# Patient Record
Sex: FEMALE | Race: WHITE | NOT HISPANIC OR LATINO | Employment: FULL TIME | URBAN - METROPOLITAN AREA
[De-identification: names, ages, dates, MRNs, and addresses within clinical notes are randomized per-mention and may not be internally consistent; named-entity substitution may affect disease eponyms.]

---

## 2017-12-20 ENCOUNTER — OFFICE VISIT (OUTPATIENT)
Dept: OBGYN | Facility: CLINIC | Age: 32
End: 2017-12-20
Payer: COMMERCIAL

## 2017-12-20 VITALS
SYSTOLIC BLOOD PRESSURE: 140 MMHG | HEART RATE: 93 BPM | TEMPERATURE: 98 F | WEIGHT: 173.8 LBS | HEIGHT: 65 IN | BODY MASS INDEX: 28.96 KG/M2 | DIASTOLIC BLOOD PRESSURE: 79 MMHG

## 2017-12-20 DIAGNOSIS — Z01.419 ENCOUNTER FOR GYNECOLOGICAL EXAMINATION WITHOUT ABNORMAL FINDING: Primary | ICD-10-CM

## 2017-12-20 DIAGNOSIS — Z98.84 BARIATRIC SURGERY STATUS: ICD-10-CM

## 2017-12-20 LAB
BASOPHILS # BLD AUTO: 0.1 10E9/L (ref 0–0.2)
BASOPHILS NFR BLD AUTO: 1.5 %
DIFFERENTIAL METHOD BLD: ABNORMAL
EOSINOPHIL # BLD AUTO: 0.2 10E9/L (ref 0–0.7)
EOSINOPHIL NFR BLD AUTO: 4.2 %
ERYTHROCYTE [DISTWIDTH] IN BLOOD BY AUTOMATED COUNT: 13.8 % (ref 10–15)
FERRITIN SERPL-MCNC: 5 NG/ML (ref 12–150)
HCT VFR BLD AUTO: 39.4 % (ref 35–47)
HGB BLD-MCNC: 12.2 G/DL (ref 11.7–15.7)
IRON SERPL-MCNC: 108 UG/DL (ref 35–180)
LYMPHOCYTES # BLD AUTO: 1.6 10E9/L (ref 0.8–5.3)
LYMPHOCYTES NFR BLD AUTO: 30.7 %
MCH RBC QN AUTO: 27.7 PG (ref 26.5–33)
MCHC RBC AUTO-ENTMCNC: 31 G/DL (ref 31.5–36.5)
MCV RBC AUTO: 90 FL (ref 78–100)
MONOCYTES # BLD AUTO: 0.4 10E9/L (ref 0–1.3)
MONOCYTES NFR BLD AUTO: 7.6 %
NEUTROPHILS # BLD AUTO: 3 10E9/L (ref 1.6–8.3)
NEUTROPHILS NFR BLD AUTO: 56 %
PLATELET # BLD AUTO: 293 10E9/L (ref 150–450)
RBC # BLD AUTO: 4.4 10E12/L (ref 3.8–5.2)
VIT B12 SERPL-MCNC: 528 PG/ML (ref 193–986)
WBC # BLD AUTO: 5.3 10E9/L (ref 4–11)

## 2017-12-20 PROCEDURE — G0145 SCR C/V CYTO,THINLAYER,RESCR: HCPCS | Performed by: NURSE PRACTITIONER

## 2017-12-20 PROCEDURE — 82728 ASSAY OF FERRITIN: CPT | Performed by: NURSE PRACTITIONER

## 2017-12-20 PROCEDURE — 82306 VITAMIN D 25 HYDROXY: CPT | Performed by: NURSE PRACTITIONER

## 2017-12-20 PROCEDURE — 82607 VITAMIN B-12: CPT | Performed by: NURSE PRACTITIONER

## 2017-12-20 PROCEDURE — 99395 PREV VISIT EST AGE 18-39: CPT | Performed by: NURSE PRACTITIONER

## 2017-12-20 PROCEDURE — 36415 COLL VENOUS BLD VENIPUNCTURE: CPT | Performed by: NURSE PRACTITIONER

## 2017-12-20 PROCEDURE — 87624 HPV HI-RISK TYP POOLED RSLT: CPT | Performed by: NURSE PRACTITIONER

## 2017-12-20 PROCEDURE — 85025 COMPLETE CBC W/AUTO DIFF WBC: CPT | Performed by: NURSE PRACTITIONER

## 2017-12-20 PROCEDURE — 83540 ASSAY OF IRON: CPT | Performed by: NURSE PRACTITIONER

## 2017-12-20 ASSESSMENT — PAIN SCALES - GENERAL: PAINLEVEL: NO PAIN (0)

## 2017-12-20 NOTE — MR AVS SNAPSHOT
"              After Visit Summary   12/20/2017    Kevon Wiseman    MRN: [id number]           Patient Information     Date Of Birth          1985        Visit Information        Provider Department      12/20/2017 10:30 AM Etta Haynes APRN CNP [facility]        Today's Diagnoses     Encounter for gynecological examination without abnormal finding    -  1    Bariatric surgery status           Follow-ups after your visit        Who to contact     If you have questions or need follow up information about today's clinic visit or your schedule please contact [facility] directly at [phone].  Normal or non-critical lab and imaging results will be communicated to you by AssertIDhart, letter or phone within 4 business days after the clinic has received the results. If you do not hear from us within 7 days, please contact the clinic through AssertIDhart or phone. If you have a critical or abnormal lab result, we will notify you by phone as soon as possible.  Submit refill requests through [employer] or call your pharmacy and they will forward the refill request to us. Please allow 3 business days for your refill to be completed.          Additional Information About Your Visit        MyChart Information     [employer] gives you secure access to your electronic health record. If you see a primary care provider, you can also send messages to your care team and make appointments. If you have questions, please call your primary care clinic.  If you do not have a primary care provider, please call [phone] and they will assist you.        Care EveryWhere ID     This is your Care EveryWhere ID. This could be used by other organizations to access your [place] medical records  [id number]        Your Vitals Were     Pulse Temperature Height Last Period BMI (Body Mass Index)       93 98  F (36.7  C) (Oral) 5' 5\" (1.651 m) 12/10/2017 (Exact Date) 28.92 kg/m2        Blood Pressure from Last 3 " Encounters:   12/20/17 140/79   06/22/16 133/86   06/15/16 102/64    Weight from Last 3 Encounters:   12/20/17 173 lb 12.8 oz (78.8 kg)   06/22/16 164 lb (74.4 kg)   06/15/16 164 lb (74.4 kg)              We Performed the Following     CBC with platelets differential     Ferritin     HPV High Risk Types DNA Cervical     Iron     Pap imaged thin layer screen with HPV - recommended age 30 - 65 years (select HPV order below)     Vitamin B12     Vitamin D Deficiency          Today's Medication Changes          These changes are accurate as of: 12/20/17 11:14 AM.  If you have any questions, ask your nurse or doctor.               Stop taking these medicines if you haven't already. Please contact your care team if you have questions.     hydrOXYzine 25 MG tablet   Commonly known as:  ATARAX   Stopped by:  Etta Haynes APRN CNP           LORazepam 0.5 MG tablet   Commonly known as:  ATIVAN   Stopped by:  Etta Haynes APRN CNP           pantoprazole 40 MG EC tablet   Commonly known as:  PROTONIX   Stopped by:  Etta Haynes APRN CNP           sertraline 100 MG tablet   Commonly known as:  ZOLOFT   Stopped by:  Etta Haynes APRN CNP                    Primary Care Provider Office Phone # Fax #    [facility] [phone]       No address on file        Equal Access to Services     DECLAN STEWART : Hadii allyson coronado hadasho Soomaali, waaxda luqadaha, qaybta kaalmada adeamanda, radha diggs. So [facility] [phone].    ATENCIÓN: Si habla español, tiene a de león disposición servicios gratuitos de asistencia lingüística. Micaela al [phone].    We comply with applicable federal civil rights laws and [place] laws. We do not discriminate on the basis of race, color, national origin, age, disability, sex, sexual orientation, or gender identity.            Thank you!     Thank you for choosing [facility]  for your care. Our goal is always to  provide you with excellent care. Hearing back from our patients is one way we can continue to improve our services. Please take a few minutes to complete the written survey that you may receive in the mail after your visit with us. Thank you!             Your Updated Medication List - Protect others around you: Learn how to safely use, store and throw away your medicines at www.disposemymeds.org.          This list is accurate as of: 12/20/17 11:14 AM.  Always use your most recent med list.                   Brand Name Dispense Instructions for use Diagnosis    IRON-VITAMIN C PO      Take 1 tablet by mouth daily        MULTIPLE VITAMIN PO      Take 2 chew tab by mouth daily        POTASSIUM PO      Take 1 tablet by mouth daily        PRENATAL VITAMIN PO      Take 2 chew tab by mouth daily

## 2017-12-20 NOTE — LETTER
January 6, 2018    Kevon Wiseman  [address] [facility] [address]    Dear Kevon,  We are happy to inform you that your PAP smear result from 12/20/17 is normal.  We are now able to do a follow up test on PAP smears. The DNA test is for HPV (Human Papilloma Virus). Cervical cancer is closely linked with certain types of HPV. Your result showed no evidence of high risk HPV.  Therefore we recommend you return in 3 years for your next pap smear.  You will still need to return to the clinic every year for an annual exam and other preventive tests.  Please contact the clinic at [phone] with any questions.  Sincerely,    SARAY Acharya CNP/rlm

## 2017-12-20 NOTE — NURSING NOTE
"Chief Complaint   Patient presents with     Physical       Initial /79  Pulse 93  Temp 98  F (36.7  C) (Oral)  Ht 5' 5\" (1.651 m)  Wt 173 lb 12.8 oz (78.8 kg)  LMP 12/10/2017 (Exact Date)  BMI 28.92 kg/m2 Estimated body mass index is 28.92 kg/(m^2) as calculated from the following:    Height as of this encounter: 5' 5\" (1.651 m).    Weight as of this encounter: 173 lb 12.8 oz (78.8 kg)..  BP completed using cuff size: sylvia Zhou CMA    "

## 2017-12-21 LAB — DEPRECATED CALCIDIOL+CALCIFEROL SERPL-MC: 25 UG/L (ref 20–75)

## 2017-12-26 LAB
COPATH REPORT: NORMAL
PAP: NORMAL

## 2017-12-28 LAB
FINAL DIAGNOSIS: NORMAL
HPV HR 12 DNA CVX QL NAA+PROBE: NEGATIVE
HPV16 DNA SPEC QL NAA+PROBE: NEGATIVE
HPV18 DNA SPEC QL NAA+PROBE: NEGATIVE
SPECIMEN DESCRIPTION: NORMAL

## 2019-11-03 ENCOUNTER — HEALTH MAINTENANCE LETTER (OUTPATIENT)
Age: 34
End: 2019-11-03

## 2020-11-16 ENCOUNTER — HEALTH MAINTENANCE LETTER (OUTPATIENT)
Age: 35
End: 2020-11-16

## 2021-04-03 ENCOUNTER — HEALTH MAINTENANCE LETTER (OUTPATIENT)
Age: 36
End: 2021-04-03

## 2021-09-18 ENCOUNTER — HEALTH MAINTENANCE LETTER (OUTPATIENT)
Age: 36
End: 2021-09-18

## 2022-01-02 ENCOUNTER — HEALTH MAINTENANCE LETTER (OUTPATIENT)
Age: 37
End: 2022-01-02

## 2022-11-19 ENCOUNTER — HEALTH MAINTENANCE LETTER (OUTPATIENT)
Age: 37
End: 2022-11-19

## 2023-04-09 ENCOUNTER — HEALTH MAINTENANCE LETTER (OUTPATIENT)
Age: 38
End: 2023-04-09

## 2024-06-06 ENCOUNTER — HOSPITAL ENCOUNTER (OUTPATIENT)
Facility: HOSPITAL | Age: 39
Setting detail: OBSERVATION
Discharge: HOME | End: 2024-06-07
Attending: EMERGENCY MEDICINE | Admitting: EMERGENCY MEDICINE
Payer: COMMERCIAL

## 2024-06-06 ENCOUNTER — HOSPITAL ENCOUNTER (INPATIENT)
Facility: HOSPITAL | Age: 39
Setting detail: SURGERY ADMIT
End: 2024-06-06
Attending: SPECIALIST | Admitting: SPECIALIST
Payer: COMMERCIAL

## 2024-06-06 DIAGNOSIS — K81.9 CHOLECYSTITIS: ICD-10-CM

## 2024-06-06 DIAGNOSIS — K80.20 CALCULUS OF GALLBLADDER WITHOUT CHOLECYSTITIS WITHOUT OBSTRUCTION: Primary | ICD-10-CM

## 2024-06-06 LAB
ALBUMIN SERPL BCG-MCNC: 3.7 G/DL (ref 3.5–5.2)
ALP SERPL-CCNC: 61 U/L (ref 40–150)
ALT SERPL W P-5'-P-CCNC: 30 U/L (ref 0–50)
ANION GAP SERPL CALCULATED.3IONS-SCNC: 8 MMOL/L (ref 7–15)
AST SERPL W P-5'-P-CCNC: 44 U/L (ref 0–45)
BILIRUB SERPL-MCNC: 0.7 MG/DL
BUN SERPL-MCNC: 7.8 MG/DL (ref 6–20)
CALCIUM SERPL-MCNC: 8.3 MG/DL (ref 8.6–10)
CHLORIDE SERPL-SCNC: 105 MMOL/L (ref 98–107)
CREAT SERPL-MCNC: 0.89 MG/DL (ref 0.51–0.95)
DEPRECATED HCO3 PLAS-SCNC: 24 MMOL/L (ref 22–29)
EGFRCR SERPLBLD CKD-EPI 2021: 85 ML/MIN/1.73M2
ERYTHROCYTE [DISTWIDTH] IN BLOOD BY AUTOMATED COUNT: 16.5 % (ref 10–15)
GLUCOSE SERPL-MCNC: 102 MG/DL (ref 70–99)
HCT VFR BLD AUTO: 31.3 % (ref 35–47)
HGB BLD-MCNC: 9.4 G/DL (ref 11.7–15.7)
IRON BINDING CAPACITY (ROCHE): 322 UG/DL (ref 240–430)
IRON SATN MFR SERPL: 17 % (ref 15–46)
IRON SERPL-MCNC: 55 UG/DL (ref 37–145)
MCH RBC QN AUTO: 24.9 PG (ref 26.5–33)
MCHC RBC AUTO-ENTMCNC: 30 G/DL (ref 31.5–36.5)
MCV RBC AUTO: 83 FL (ref 78–100)
PLATELET # BLD AUTO: 186 10E3/UL (ref 150–450)
POTASSIUM SERPL-SCNC: 3.5 MMOL/L (ref 3.4–5.3)
PROT SERPL-MCNC: 6.4 G/DL (ref 6.4–8.3)
RBC # BLD AUTO: 3.77 10E6/UL (ref 3.8–5.2)
SODIUM SERPL-SCNC: 137 MMOL/L (ref 135–145)
WBC # BLD AUTO: 2.8 10E3/UL (ref 4–11)

## 2024-06-06 PROCEDURE — G0378 HOSPITAL OBSERVATION PER HR: HCPCS

## 2024-06-06 PROCEDURE — 83550 IRON BINDING TEST: CPT

## 2024-06-06 PROCEDURE — 36415 COLL VENOUS BLD VENIPUNCTURE: CPT

## 2024-06-06 PROCEDURE — 96376 TX/PRO/DX INJ SAME DRUG ADON: CPT

## 2024-06-06 PROCEDURE — 250N000011 HC RX IP 250 OP 636: Mod: JZ

## 2024-06-06 PROCEDURE — 99222 1ST HOSP IP/OBS MODERATE 55: CPT

## 2024-06-06 PROCEDURE — 96375 TX/PRO/DX INJ NEW DRUG ADDON: CPT

## 2024-06-06 PROCEDURE — 99207 PR APP CREDIT; MD BILLING SHARED VISIT: CPT | Performed by: INTERNAL MEDICINE

## 2024-06-06 PROCEDURE — 96374 THER/PROPH/DIAG INJ IV PUSH: CPT

## 2024-06-06 PROCEDURE — 83540 ASSAY OF IRON: CPT

## 2024-06-06 PROCEDURE — 85027 COMPLETE CBC AUTOMATED: CPT

## 2024-06-06 PROCEDURE — 250N000013 HC RX MED GY IP 250 OP 250 PS 637

## 2024-06-06 PROCEDURE — 80053 COMPREHEN METABOLIC PANEL: CPT

## 2024-06-06 PROCEDURE — 99285 EMERGENCY DEPT VISIT HI MDM: CPT | Mod: 25

## 2024-06-06 RX ORDER — ONDANSETRON 4 MG/1
4 TABLET, ORALLY DISINTEGRATING ORAL EVERY 6 HOURS PRN
Status: DISCONTINUED | OUTPATIENT
Start: 2024-06-06 | End: 2024-06-07 | Stop reason: HOSPADM

## 2024-06-06 RX ORDER — OXYCODONE HYDROCHLORIDE 5 MG/1
5 TABLET ORAL EVERY 4 HOURS PRN
Status: DISCONTINUED | OUTPATIENT
Start: 2024-06-06 | End: 2024-06-07 | Stop reason: HOSPADM

## 2024-06-06 RX ORDER — POLYETHYLENE GLYCOL 3350 17 G/17G
17 POWDER, FOR SOLUTION ORAL 2 TIMES DAILY PRN
Status: DISCONTINUED | OUTPATIENT
Start: 2024-06-06 | End: 2024-06-07 | Stop reason: HOSPADM

## 2024-06-06 RX ORDER — PROCHLORPERAZINE MALEATE 10 MG
10 TABLET ORAL EVERY 6 HOURS PRN
Status: DISCONTINUED | OUTPATIENT
Start: 2024-06-06 | End: 2024-06-07 | Stop reason: HOSPADM

## 2024-06-06 RX ORDER — NALOXONE HYDROCHLORIDE 0.4 MG/ML
0.2 INJECTION, SOLUTION INTRAMUSCULAR; INTRAVENOUS; SUBCUTANEOUS
Status: DISCONTINUED | OUTPATIENT
Start: 2024-06-06 | End: 2024-06-07 | Stop reason: HOSPADM

## 2024-06-06 RX ORDER — ACETAMINOPHEN 325 MG/1
975 TABLET ORAL ONCE
Status: CANCELLED | OUTPATIENT
Start: 2024-06-06 | End: 2024-06-06

## 2024-06-06 RX ORDER — HYDROMORPHONE HCL IN WATER/PF 6 MG/30 ML
0.5 PATIENT CONTROLLED ANALGESIA SYRINGE INTRAVENOUS ONCE
Status: COMPLETED | OUTPATIENT
Start: 2024-06-06 | End: 2024-06-06

## 2024-06-06 RX ORDER — PROCHLORPERAZINE 25 MG
25 SUPPOSITORY, RECTAL RECTAL EVERY 12 HOURS PRN
Status: DISCONTINUED | OUTPATIENT
Start: 2024-06-06 | End: 2024-06-07 | Stop reason: HOSPADM

## 2024-06-06 RX ORDER — NALOXONE HYDROCHLORIDE 0.4 MG/ML
0.4 INJECTION, SOLUTION INTRAMUSCULAR; INTRAVENOUS; SUBCUTANEOUS
Status: DISCONTINUED | OUTPATIENT
Start: 2024-06-06 | End: 2024-06-07 | Stop reason: HOSPADM

## 2024-06-06 RX ORDER — ACETAMINOPHEN 325 MG/1
650 TABLET ORAL EVERY 4 HOURS PRN
Status: DISCONTINUED | OUTPATIENT
Start: 2024-06-06 | End: 2024-06-07 | Stop reason: HOSPADM

## 2024-06-06 RX ORDER — PIPERACILLIN SODIUM, TAZOBACTAM SODIUM 3; .375 G/15ML; G/15ML
3.38 INJECTION, POWDER, LYOPHILIZED, FOR SOLUTION INTRAVENOUS ONCE
Status: COMPLETED | OUTPATIENT
Start: 2024-06-06 | End: 2024-06-06

## 2024-06-06 RX ORDER — GABAPENTIN 100 MG/1
100 CAPSULE ORAL 3 TIMES DAILY PRN
COMMUNITY

## 2024-06-06 RX ORDER — HYDROMORPHONE HCL IN WATER/PF 6 MG/30 ML
0.2 PATIENT CONTROLLED ANALGESIA SYRINGE INTRAVENOUS
Status: DISCONTINUED | OUTPATIENT
Start: 2024-06-06 | End: 2024-06-07 | Stop reason: HOSPADM

## 2024-06-06 RX ORDER — ACETAMINOPHEN 650 MG/1
650 SUPPOSITORY RECTAL EVERY 4 HOURS PRN
Status: DISCONTINUED | OUTPATIENT
Start: 2024-06-06 | End: 2024-06-07 | Stop reason: HOSPADM

## 2024-06-06 RX ORDER — HYDROMORPHONE HCL IN WATER/PF 6 MG/30 ML
0.4 PATIENT CONTROLLED ANALGESIA SYRINGE INTRAVENOUS
Status: DISCONTINUED | OUTPATIENT
Start: 2024-06-06 | End: 2024-06-07 | Stop reason: HOSPADM

## 2024-06-06 RX ORDER — GABAPENTIN 100 MG/1
100 CAPSULE ORAL 3 TIMES DAILY PRN
Status: DISCONTINUED | OUTPATIENT
Start: 2024-06-06 | End: 2024-06-07 | Stop reason: HOSPADM

## 2024-06-06 RX ORDER — PIPERACILLIN SODIUM, TAZOBACTAM SODIUM 3; .375 G/15ML; G/15ML
3.38 INJECTION, POWDER, LYOPHILIZED, FOR SOLUTION INTRAVENOUS EVERY 8 HOURS
Status: DISCONTINUED | OUTPATIENT
Start: 2024-06-06 | End: 2024-06-07 | Stop reason: HOSPADM

## 2024-06-06 RX ORDER — AMOXICILLIN 250 MG
1 CAPSULE ORAL 2 TIMES DAILY PRN
Status: DISCONTINUED | OUTPATIENT
Start: 2024-06-06 | End: 2024-06-07 | Stop reason: HOSPADM

## 2024-06-06 RX ORDER — HYDROXYZINE HYDROCHLORIDE 10 MG/1
10 TABLET, FILM COATED ORAL 3 TIMES DAILY PRN
COMMUNITY

## 2024-06-06 RX ORDER — AMOXICILLIN 250 MG
2 CAPSULE ORAL 2 TIMES DAILY PRN
Status: DISCONTINUED | OUTPATIENT
Start: 2024-06-06 | End: 2024-06-07 | Stop reason: HOSPADM

## 2024-06-06 RX ORDER — ONDANSETRON 2 MG/ML
4 INJECTION INTRAMUSCULAR; INTRAVENOUS EVERY 6 HOURS PRN
Status: DISCONTINUED | OUTPATIENT
Start: 2024-06-06 | End: 2024-06-07 | Stop reason: HOSPADM

## 2024-06-06 RX ADMIN — PIPERACILLIN AND TAZOBACTAM 3.38 G: 3; .375 INJECTION, POWDER, FOR SOLUTION INTRAVENOUS at 23:33

## 2024-06-06 RX ADMIN — HYDROMORPHONE HYDROCHLORIDE 0.2 MG: 0.2 INJECTION, SOLUTION INTRAMUSCULAR; INTRAVENOUS; SUBCUTANEOUS at 20:53

## 2024-06-06 RX ADMIN — ACETAMINOPHEN 650 MG: 325 TABLET ORAL at 20:57

## 2024-06-06 RX ADMIN — OXYCODONE HYDROCHLORIDE 5 MG: 5 TABLET ORAL at 23:33

## 2024-06-06 RX ADMIN — HYDROMORPHONE HYDROCHLORIDE 0.5 MG: 0.2 INJECTION, SOLUTION INTRAMUSCULAR; INTRAVENOUS; SUBCUTANEOUS at 17:04

## 2024-06-06 RX ADMIN — PIPERACILLIN AND TAZOBACTAM 3.38 G: 3; .375 INJECTION, POWDER, FOR SOLUTION INTRAVENOUS at 17:10

## 2024-06-06 ASSESSMENT — ACTIVITIES OF DAILY LIVING (ADL)
ADLS_ACUITY_SCORE: 35
DEPENDENT_IADLS:: INDEPENDENT
ADLS_ACUITY_SCORE: 35

## 2024-06-06 ASSESSMENT — COLUMBIA-SUICIDE SEVERITY RATING SCALE - C-SSRS
6. HAVE YOU EVER DONE ANYTHING, STARTED TO DO ANYTHING, OR PREPARED TO DO ANYTHING TO END YOUR LIFE?: NO
1. IN THE PAST MONTH, HAVE YOU WISHED YOU WERE DEAD OR WISHED YOU COULD GO TO SLEEP AND NOT WAKE UP?: NO
2. HAVE YOU ACTUALLY HAD ANY THOUGHTS OF KILLING YOURSELF IN THE PAST MONTH?: NO

## 2024-06-06 NOTE — CONSULTS
"Care Management Initial Consult    General Information  Assessment completed with: Kevon Elizondo  Type of CM/SW Visit: Initial Assessment    Primary Care Provider verified and updated as needed: Yes   Readmission within the last 30 days: no previous admission in last 30 days      Reason for Consult: discharge planning  Advance Care Planning: Advance Care Planning Reviewed: no concerns identified          Communication Assessment  Patient's communication style: spoken language (English or Bilingual)             Cognitive  Cognitive/Neuro/Behavioral: WDL                      Living Environment:   People in home: other (see comments), child(luciana), dependent (\"I have been living with my partner and kids. After surgery I will stay with my parents in their house in [place]\".)     Current living Arrangements: house      Able to return to prior arrangements: yes       Family/Social Support:  Care provided by: self  Provides care for: no one  Marital Status:  (\"getting a divorce\")  Parent(s)          Description of Support System: Supportive, Involved    Support Assessment: Adequate social supports, Adequate family and caregiver support, Patient communicates needs well met    Current Resources:   Patient receiving home care services: No     Community Resources: None  Equipment currently used at home: none  Supplies currently used at home: None    Employment/Financial:  Employment Status: employed full-time (\"but my last day is 6/7/24 and then I am done for the Summer except some time that I work PT in July\")     Employment/ Comments: \"no  history\"  Financial Concerns:     Referral to Financial Worker: No       Does the patient's insurance plan have a 3 day qualifying hospital stay waiver?  No    Functional Status:  Prior to admission patient needed assistance:   Dependent ADLs:: Independent, Ambulation-no assistive device  Dependent IADLs:: Independent  Assesssment of Functional Status: At functional " "baseline    Mental Health Status:  Mental Health Status: Past Concern  Mental Health Management: Medication, Psychiatrist    Chemical Dependency Status:  Chemical Dependency Status: Past Concern             Values/Beliefs:  Spiritual, Cultural Beliefs, Gnosticist Practices, Values that affect care:                 Additional Information:  Kevon lives in a house with family, but post surgery she plans to stay in her parent's house in [place]. \"They have a 1 level house.\"    She is independent with ADLs and IADLs. She drives and works full time.    Likely home with no new needs.     Plans to drive herself home if able or can get a ride if needed. Her car is in the parking lot.    CM to follow for medical progression of care, discharge recommendations, and final discharge plan.    Rosenda Gilliam RN    "

## 2024-06-06 NOTE — ED PROVIDER NOTES
"Emergency Department Midlevel Supervisory Note     I had a face to face encounter with this patient seen by the Advanced Practice Provider (OTTONIEL). I personally made/approved the management plan and take responsibility for the patient management. I personally saw patient and performed a substantive portion of the visit including all aspects of the medical decision making.     ED Course:  4:43 PM Gini High PA-C staffed patient with me. I agree with their assessment and plan of management, and I will see the patient.  5:01 PM I met with the patient to introduce myself, gather additional history, perform my initial exam, and discuss the plan.     Brief HPI:     Kevon Wiseman is a 38 year old female who presents for evaluation of abdominal pain.     Yesterday patient noticed full body aches.  This morning patient woke up patient noted right upper quadrant abdominal pain.  No other symptoms.  No fevers or chills.  No nausea or vomiting.  No diarrhea.     Patient went to urgent care and was diagnosed to cholecystitis.  The urgent care physician spoke with Dr. Reina from general surgery and plans to do cholecystectomy tomorrow.  Patient received a dose of Toradol in urgent care.  No antibiotics were given.    I, Becca Brasher, am serving as a scribe to document services personally performed by HERON MCRAE MD, based on my observations and the provider's statements to me.   I, HERON MCRAE MD attest that Becca Brasher was acting in a scribe capacity, has observed my performance of the services and has documented them in accordance with my direction.    Brief Physical Exam: /76 (BP Location: Left arm)   Pulse 78   Temp 98.9  F (37.2  C) (Oral)   Resp 17   Ht 1.626 m (5' 4\")   Wt 79.4 kg (175 lb)   SpO2 99%   BMI 30.04 kg/m    Constitutional:  Alert, in no acute distress  EYES: Conjunctivae clear  HENT:  Atraumatic  Respiratory:  Respirations even, unlabored, in no acute respiratory " distress  Cardiovascular:  Regular rate and rhythm, good peripheral perfusion  GI: Soft, non-distended, non-tender  Musculoskeletal:  Moves all 4 extremities equally, grossly symmetrical strength  Integument: Warm & dry. No appreciable rash, erythema.  Neurologic:  Alert & oriented, speech clear and fluent, no focal deficits noted  Psych: Normal mood and affect       MDM:  Again, patient is a 38-year-old female who presents with abdominal pain.  Patient is afebrile with no tachycardia or hypoxia.  Patient seen previously today at urgent care where CT imaging reported cholelithiasis, ultrasound imaging reported gallstones within contracted gallbladder with a positive sonographic Milligan sign.  Per report patient case discussed with general surgery and at this time recommendations are for admission, antibiotics and surgical consultation.  We will place orders for antibiotics and pain management and likely consult with general surgery just to let them know that patient is now here in the department.  Patient will be admitted.       1. Cholecystitis        Consults:  Patient case discussed with general surgery    Labs and Imaging:       I independently interpreted outside labs, patient had no signs of acute kidney injury with a creatinine of 0.89, and no signs of anemia with a hemoglobin of 10.8.    I have reviewed the relevant laboratory studies above.      I independently interpreted the following imaging study(s):       EKG: I reviewed and independently interpreted the patient's EKG, with comments made as listed below. Please see scanned EKG for full report.       Procedures:  I was present for the key portions of procedures documented in OTTONIEL/midlevel note, see midlevel note for further details.    YEHUDA CONTE MD  [facility] EMERGENCY DEPARTMENT  [address] [place] [id number]  [phone]       Yehuda Conte MD  06/06/24 2010

## 2024-06-06 NOTE — ED NOTES
Expected Patient Referral to ED  3:49 PM    Referring Clinic/Provider:  [facility]    Reason for referral/Clinical facts:  Early cholecystitis, gallstones and bumped LFTs but no pericholecystic fluid, urgency room spoke with Dr. Reina of surgery and plans to surgerize tomorrow.     Recommendations provided:  Send to ED for further evaluation    Caller was informed that this institution does possess the capabilities and/or resources to provide for patient and should be transferred to our facility.    Discussed that if direct admit is sought and any hurdles are encountered, this ED would be happy to see the patient and evaluate.    Informed caller that recommendations provided are recommendations based only on the facts provided and that they responsible to accept or reject the advice, or to seek a formal in person consultation as needed and that this ED will see/treat patient should they arrive.      Mo Medel MD  [facility] EMERGENCY DEPARTMENT  [address] [place] [id number]  [phone]       Mo Medel MD  06/06/24 6241

## 2024-06-06 NOTE — H&P
[facility]    History and Physical - Hospitalist Service       Date of Admission:  6/6/2024    Assessment & Plan    Kevon Wiseman is a 38 year old female with PMHx of gastric bypass, LIZABETH, BRCA1 mutation who was admitted on 6/6/2024 for acute cholecysitis. She presented to the UC for abdominal pain and myalgias. US concerning for acute cholecystitis; gen surg consulted     RUQ Pain, Concern for Acute Cholecystitis   -- CT AP: cholelithiasis   -- US: Gallstones within a contracted gallbladder with positive sonographic Milligan's sign. Hepatic steatosis   -- ALT/AST minimally elevated in UC, normal here; trend CMP   -- Started on Zosyn in the ED; continue for now   -- Surgery consult placed   -- NPO MN   -- Pain control with Tylenol, PRN oxycodone/Diluadid   -- Antiemetics PRN     Anemia   Leukopenia   ? Unclear etiology, suspect secondary to infection as above   -- Hgb 9.4 (down from 10.8 in the UC)  -- WBC 2.8 (down from 3.5 in the UC)  -- No s/s of bleeding   -- Patient does have a history of BRCA1 mutation, no masses seen on CT AP  -- Fe panel pending   -- Trend CBC; will check diff again in the AM     Hernia - pt reports hx of known hernia and has been discussing this with her PCP; has been referred outpatient to surgery   -- CT AP: Tiny fat-containing paraumbilical hernia and a small fat-containing supraumbilical ventral hernia   -- continue outpatient follow-up     LIZABETH / Insomnia   Patient states she has not been taking fluoxetine for about a month (med rec reports 1 week), will hold fluoxetine for now and instructed patient to follow-up with PCP on when/how to reinitiate  -- Holding as needed hydroxyzine (patient states she does not like taking this either)  -- Continue as needed gabapentin, patient states she takes this very infrequently    H/o Gastric bypass (2011) - would avoid further NSAIDs     BRCA1 gene mutation positive   AUD in Remission        Observation Goals: -diagnostic  "tests and consults completed and resulted, -vital signs normal or at patient baseline, -adequate pain control on oral analgesics, -infection is improving, -safe disposition plan has been identified, Nurse to notify provider when observation goals have been met and patient is ready for discharge.  Diet: Regular Diet Adult  NPO per Anesthesia Guidelines for Procedure/Surgery Except for: Meds  DVT Prophylaxis: Pneumatic Compression Devices  Pratt Catheter: Not present  Lines: None     Cardiac Monitoring: None  Code Status: Full Code    Clinically Significant Risk Factors Present on Admission                              # Obesity: Estimated body mass index is 30.04 kg/m  as calculated from the following:    Height as of this encounter: 1.626 m (5' 4\").    Weight as of this encounter: 79.4 kg (175 lb).       # Asthma: noted on problem list        Disposition Plan     Medically Ready for Discharge: Anticipated Tomorrow       The patient's care was discussed with the Attending Physician, Dr. Christine Jackson who independently met with and assessed the patient and is in agreement with the assessment and plan     Moriah Fry PA-C  Hospitalist [facility]  Securely message with [employer] (more info)  Text page via [facility] Paging/Directory     ______________________________________________________________________    Chief Complaint   RUQ Pain   Myalgias     History is obtained from the patient    History of Present Illness   Kevon Wiseman is a 38 year old female with PMHx of LIZABETH, gastric bypass, BRCA positive who presented to the  for evaluation of RUQ abdominal pain and myalgias today. Had radiation of the abdominal pain to her RLQ.  Went to the  and states evaluation there was concerning for acute cholecystitis so she was told to come to the ER.  Had some nausea but denies any vomiting.  No fevers or chills.  Has a history of gastric bypass surgery in 2011.  No poor reaction responses to " anesthesia in the past.  States pain is currently a 3/10.  Patient also reports a history of known hernia and is asking if this could be repaired by the surgical team while she undergoes laparoscopic cholecystectomy.    Past Medical History    Past Medical History:   Diagnosis Date    Asthma     Ulcer 2013    Bleeding ulcer    Unspecified closed fracture of ankle 2002    Left ankle/leg       Past Surgical History   Past Surgical History:   Procedure Laterality Date    ESOPHAGOSCOPY, GASTROSCOPY, DUODENOSCOPY (EGD), COMBINED N/A 6/24/2015    Procedure: COMBINED ESOPHAGOSCOPY, GASTROSCOPY, DUODENOSCOPY (EGD);  Surgeon: Duane, William Charles, MD;  Location: [facility] OR    GASTRIC BYPASS  7/27/2011    SURGICAL HISTORY OF -   x 2    PE Tubes    SURGICAL HISTORY OF -       Adenoidectomy    SURGICAL HISTORY OF -       (R) Tympanoplasty     SURGICAL HISTORY OF -   2002    ankle    UPPER GI ENDOSCOPY         Prior to Admission Medications   Prior to Admission Medications   Prescriptions Last Dose Informant Patient Reported? Taking?   FLUoxetine (PROZAC) 20 MG capsule More than a month  Yes Yes   Sig: Take 20 mg by mouth daily   gabapentin (NEURONTIN) 100 MG capsule Unknown  Yes Yes   Sig: Take 100 mg by mouth 3 times daily as needed for neuropathic pain   hydrOXYzine HCl (ATARAX) 10 MG tablet Unknown  Yes Yes   Sig: Take 10 mg by mouth 3 times daily as needed for anxiety      Facility-Administered Medications: None           Physical Exam   Vital Signs: Temp: 98.9  F (37.2  C) Temp src: Oral BP: 131/83 Pulse: 83   Resp: 16 SpO2: 100 % O2 Device: None (Room air)    Weight: 175 lbs 0 oz    Constitutional: awake, alert, no apparent distress, and appears stated age  Eyes: Lids and lashes normal, extra ocular muscles intact, sclera clear, conjunctiva normal  Respiratory: No increased work of breathing, no accessory muscle use, clear to auscultation bilaterally, no crackles or wheezing  Cardiovascular: Regular rate and rhythm, normal  S1 and S2, no S3 or S4, and no murmur noted  GI: Soft, non-distended, normal bowel sounds, no masses palpated. Tenderness with palpation to RUQ and RLQ.   Skin: Normal skin color, texture, turgor. No rashes and no jaundice  Musculoskeletal: no lower extremity pitting edema present.   Neurologic: Awake, alert.   Neuropsychiatric: Appropriate mood, affect and eye contact. Cooperative.    Medical Decision Making             Data         Imaging results reviewed over the past 24 hrs:   Recent Results (from the past 24 hour(s))   XR Chest B Read 2 views    Narrative    For Patients: As a result of the 21st Century Cures Act, medical imaging exams and procedure reports are released immediately into your electronic medical record. You may view this report before your referring provider. If you have questions, please contact your health care provider.    EXAM: XR CHEST 2 VIEWS PA AND LATERAL  LOCATION: [facility]  DATE: 6/6/2024    INDICATION: Cough  COMPARISON: None.    Impression    Negative chest.   US Abdomen Limited Portable    Narrative    For Patients: As a result of the 21st Century Cures Act, medical imaging exams and procedure reports are released immediately into your electronic medical record. You may view this report before your referring provider. If you have questions, please contact your health care provider.    EXAM: US ABDOMEN LIMITED RUQ  LOCATION: [facility]  DATE: 6/6/2024    INDICATION: Right upper quadrant pain  COMPARISON: CT AP 1/29/2019  TECHNIQUE: Limited abdominal ultrasound.    FINDINGS:    GALLBLADDER: Gallstones in slightly contracted gallbladder. No wall thickening, or pericholecystic fluid. Positive sonographic Milligan's sign.    BILE DUCTS: No biliary dilatation. The common duct measures 4 mm.    LIVER: Increased echotexture. No focal mass.    RIGHT KIDNEY: No hydronephrosis.    PANCREAS: The visualized portions are normal.    No ascites.     Impression    1.  Gallstones within a contracted gallbladder with positive sonographic Milligan's sign. No other ultrasound findings to suggest cholecystitis.  2.  Hepatobiliary scan can  further evaluate gall bladder as needed.  3.  Hepatic steatosis.   CT Abdomen Pelvis w Contrast    Narrative    For Patients: As a result of the 21st Century Cures Act, medical imaging exams and procedure reports are released immediately into your electronic medical record. You may view this report before your referring provider. If you have questions, please contact your health care provider.    EXAM: CT ABDOMEN PELVIS W  LOCATION: [facility]  DATE: 6/6/2024    INDICATION: RUQ pain, history of gastric bypass, 2011  COMPARISON: Ultrasound 6/6/2024, CT 1/29/2019  TECHNIQUE: CT scan of the abdomen and pelvis was performed following injection of IV contrast. Multiplanar reformats were obtained. Dose reduction techniques were used.  CONTRAST: IOPAMIDOL 300 MG/ML  ML BOTTLE: 100mL    FINDINGS:   LOWER CHEST: Normal.    HEPATOBILIARY: Normal contour with no significant mass. No bile duct dilatation. Tiny gallstones.    PANCREAS: Normal.    SPLEEN: Normal.    ADRENAL GLANDS: Normal.    KIDNEYS/BLADDER: Normal.    BOWEL: No obstruction or inflammatory change. Normal appendix. Postsurgical changes from gastric bypass.    LYMPH NODES: Normal.    VASCULATURE: Normal.    PELVIC ORGANS: Normal.    MUSCULOSKELETAL: Tiny fat-containing paraumbilical hernia and a small fat-containing supraumbilical ventral hernia. Bilateral L5-S1 pars defects.    Impression    No acute findings in the abdomen or pelvis. Cholelithiasis, but no acute inflammation or dilation.

## 2024-06-06 NOTE — PHARMACY-VANCOMYCIN DOSING SERVICE
"Pharmacy Vancomycin Initial Note  Date of Service 2024  Patient's  1985  38 year old, female    Indication: Intra-abdominal infection    Current estimated CrCl = CrCl cannot be calculated (Patient's most recent lab result is older than the maximum 365 days allowed.).    Creatinine for last 3 days  No results found for requested labs within last 3 days.    Recent Vancomycin Level(s) for last 3 days  No results found for requested labs within last 3 days.      Vancomycin IV Administrations (past 72 hours)        No vancomycin orders with administrations in past 72 hours.                    Nephrotoxins and other renal medications (From now, onward)      Start     Dose/Rate Route Frequency Ordered Stop    24 1700  piperacillin-tazobactam (ZOSYN) 3.375 g vial to attach to  mL bag        Note to Pharmacy: For [facility], [facility] and [facility]: For Zosyn-naive patients, use the \"Zosyn initial dose + extended infusion\" order panel.    3.375 g  over 240 Minutes Intravenous ONCE 24 1641              Contrast Orders - past 72 hours (72h ago, onward)      None              Plan:  Start vancomycin  1500 mg IV once.  Please re-consult pharmacy if therapy to continue upon discharge.    Coby Fontanez, [facility]    "

## 2024-06-06 NOTE — ED PROVIDER NOTES
EMERGENCY DEPARTMENT ENCOUNTER      NAME: Kevon Wiseman  AGE: 38 year old female  YOB: 1985  MRN: [id number]  EVALUATION DATE & TIME: 6/6/2024  4:40 PM    PCP: Emelia Crystal    ED PROVIDER: Gini High PA-C      Chief Complaint   Patient presents with    Abdominal Pain     FINAL IMPRESSION:  1. Cholecystitis      ED COURSE & MEDICAL DECISION MAKING:    Pertinent Labs & Imaging studies reviewed. (See chart for details)  38 year old female presents to the Emergency Department for evaluation of abdominal pain.  Yesterday patient started to have full body aches.  This morning patient woke up with right upper quadrant abdominal pain.  No fevers or chills at home.  Patient went to urgent care for her pain.  Patient received Toradol.  Lipase was within normal limits.  CMP showed normal potassium and sodium.  AST and ALT were elevated.  CBC shows a decreased white blood cell count of 3.5.  Hemoglobin is 10.8.  Negative pregnancy.  UA shows no nitrates or leukocytes.  CT of the abdomen was done which shows no acute findings of the abdomen or pelvis. Cholelithiasis.  No other acute inflammation or dilation.  Ultrasound of the right upper quadrant was done which shows gallstones within the contracted gallbladder with positive Milligan sign.  Urgent care contacted general surgery and spoke with Dr. Reina who agrees to do cholecystectomy tomorrow morning.  Vital signs reviewed and patient is hypertensive most likely secondary to pain.  Afebrile.  On exam patient is tender to palpation in the right upper quadrant.  No rash.  Patient is resting comfortably and answering questions.  Patient was all 4 extremities without difficulty.    Zosyn were ordered for the patient. General surgery did not want vancomycin. Dilaudid was ordered for the patient.  I spoke with the hospitalist who agrees to admit the patient.  Patient was educated on her results from urgent care and the plan for admission.  Patient agrees  with plan.  All questions answered.       ED COURSE:   4:43 PM  I saw the patient. Staffed with Dr. Conte.   4:46 PM I paged the hospitalist.  4:52 PM I spoke with the hospitalist who agrees to admit the patient.  All questions answered.  4:52 PM Patient is agreeable to admission.  5:09 PM I spoke with general surgery and they are aware patient is here.        At the conclusion of the encounter I discussed the results of all of the tests and the disposition. The questions were answered. The patient or family acknowledged understanding and was agreeable with the care plan.     0 minutes of critical care time       Medical Decision Making  Obtained supplemental history:Supplemental history obtained?: No  Reviewed external records: External records reviewed?: No  Care impacted by chronic illness:N/A  Care significantly affected by social determinants of health:N/A  Did you consider but not order tests?: Work up considered but not performed and documented in chart, if applicable  Did you interpret images independently?: Independent interpretation of ECG and images noted in documentation, when applicable.  Consultation discussion with other provider:Did you involve another provider (consultant, , pharmacy, etc.)?: I discussed the care with another health care provider, see documentation for details.  Admit.      MEDICATIONS GIVEN IN THE EMERGENCY:  Medications   piperacillin-tazobactam (ZOSYN) 3.375 g vial to attach to  mL bag (3.375 g Intravenous $Given 6/6/24 1710)   HYDROmorphone (DILAUDID) injection 0.5 mg (0.5 mg Intravenous $Given 6/6/24 1704)       NEW PRESCRIPTIONS STARTED AT TODAY'S ER VISIT  New Prescriptions    No medications on file          =================================================================    HPI    Patient information was obtained from: patient    Use of : N/A         Kevon Wiseman is a 38 year old female with a pertinent history of asthma, depression, panic disorder,  obesity who presents to this ED for evaluation of abdominal pain.    Yesterday patient noticed full body aches.  This morning, patient woke up with right upper quadrant abdominal pain.  No other symptoms.  No fevers or chills.  No nausea or vomiting.  No diarrhea.    Patient went to urgent care and was diagnosed to cholecystitis.  The urgent care physician spoke with Dr. Reina from general surgery and plans to do cholecystectomy tomorrow.  Patient received a dose of Toradol in urgent care.  No antibiotics were given.      REVIEW OF SYSTEMS   As per HPI    PAST MEDICAL HISTORY:  Past Medical History:   Diagnosis Date    Asthma     Ulcer     Bleeding ulcer    Unspecified closed fracture of ankle     Left ankle/leg       PAST SURGICAL HISTORY:  Past Surgical History:   Procedure Laterality Date    ESOPHAGOSCOPY, GASTROSCOPY, DUODENOSCOPY (EGD), COMBINED N/A 2015    Procedure: COMBINED ESOPHAGOSCOPY, GASTROSCOPY, DUODENOSCOPY (EGD);  Surgeon: Duane, William Charles, MD;  Location: [facility] OR    GASTRIC BYPASS  2011    SURGICAL HISTORY OF -   x 2    PE Tubes    SURGICAL HISTORY OF -       Adenoidectomy    SURGICAL HISTORY OF -       (R) Tympanoplasty     SURGICAL HISTORY OF -       ankle    UPPER GI ENDOSCOPY             CURRENT MEDICATIONS:    IRON-VITAMIN C PO  MULTIPLE VITAMIN PO  POTASSIUM PO  Prenatal Vit-Fe Fumarate-FA (PRENATAL VITAMIN PO)        ALLERGIES:  No Known Allergies    FAMILY HISTORY:  Family History   Problem Relation Age of Onset    Cancer Mother 52        Ovarian, + BRCA 1    Gastrointestinal Disease Mother         hep c    Cancer Maternal Aunt 46        Ovarian-    Breast Cancer Maternal Aunt 44            Breast Cancer Other 25    Asthma No family hx of     C.A.D. No family hx of     Diabetes No family hx of     Hypertension No family hx of     Cerebrovascular Disease No family hx of     Cancer - colorectal No family hx of     Prostate Cancer No family hx of        SOCIAL  "HISTORY:   Social History     Socioeconomic History    Marital status: Single    Number of children: 0   Occupational History    Occupation:      Employer: [employer]   Substance and Sexual Activity    Alcohol use: Yes    Drug use: No    Sexual activity: Yes     Partners: Female   Other Topics Concern    Parent/sibling w/ CABG, MI or angioplasty before 65F 55M? No   Social History Narrative    Currently unemployed, has been doing some babysitting.  Living with friends, looking for work    [employer]     Social Determinants of Health     Financial Resource Strain: Not At Risk (12/1/2023)    Received from [facility]    Financial Resource Strain     Is it hard for you to pay for the very basics like food, housing, medical care or heating?: No   Food Insecurity: Not At Risk (12/1/2023)    Received from [facility]    Food Insecurity     Does your food run out before you have the money to buy more?: No   Transportation Needs: Not At Risk (12/1/2023)    Received from [employer] [facility]    Transportation Needs     Does a lack of transportation keep you from your medical appointments or from getting your medications?: No       VITALS:  /83   Pulse 81   Temp 98.9  F (37.2  C) (Oral)   Resp 16   Ht 1.626 m (5' 4\")   Wt 79.4 kg (175 lb)   SpO2 97%   BMI 30.04 kg/m      PHYSICAL EXAM    Physical Exam  Vitals and nursing note reviewed.   Constitutional:       Appearance: Normal appearance.   HENT:      Head: Atraumatic.      Right Ear: External ear normal.      Left Ear: External ear normal.      Nose: Nose normal.      Mouth/Throat:      Mouth: Mucous membranes are moist.   Eyes:      Conjunctiva/sclera: Conjunctivae normal.      Pupils: Pupils are equal, round, and reactive to light.   Cardiovascular:      Rate and Rhythm: Normal rate and regular rhythm.      Pulses: Normal pulses.      Heart sounds: Normal heart sounds. No murmur heard.     No friction rub. No gallop.   Pulmonary:      Effort: " Pulmonary effort is normal.      Breath sounds: Normal breath sounds. No wheezing or rales.   Abdominal:      Tenderness: There is abdominal tenderness in the right upper quadrant. There is no guarding or rebound.   Musculoskeletal:      Cervical back: Normal range of motion.   Skin:     General: Skin is dry.   Neurological:      Mental Status: She is alert. Mental status is at baseline.   Psychiatric:         Mood and Affect: Mood normal.         Thought Content: Thought content normal.          LAB:  All pertinent labs reviewed and interpreted.  Labs Ordered and Resulted from Time of ED Arrival to Time of ED Departure - No data to display     RADIOLOGY:  Reviewed all pertinent imaging. Please see official radiology report.  No orders to display       Gini High PA-C  [facility] EMERGENCY DEPARTMENT  [facility] [facility] [id number] [phone]     Gini High PA-C  06/06/24 [id number]

## 2024-06-06 NOTE — ED TRIAGE NOTES
Pt went to urgency room with upper right abdominal pain that started early this morning. She was diagnosed with early cholecystitis & gallstones and was found to have elevated LFTs. Urgency Room already spoke to surgeon Dr. Reina and plan is to go to OR tomorrow morning.      Triage Assessment (Adult)       Row Name 06/06/24 1630          Triage Assessment    Airway WDL WDL        Respiratory WDL    Respiratory WDL WDL        Skin Circulation/Temperature WDL    Skin Circulation/Temperature WDL WDL        Cardiac WDL    Cardiac WDL WDL        Peripheral/Neurovascular WDL    Peripheral Neurovascular WDL WDL        Cognitive/Neuro/Behavioral WDL    Cognitive/Neuro/Behavioral WDL WDL

## 2024-06-07 ENCOUNTER — APPOINTMENT (OUTPATIENT)
Dept: RADIOLOGY | Facility: HOSPITAL | Age: 39
End: 2024-06-07
Attending: SPECIALIST
Payer: COMMERCIAL

## 2024-06-07 ENCOUNTER — ANESTHESIA (OUTPATIENT)
Dept: SURGERY | Facility: HOSPITAL | Age: 39
End: 2024-06-07
Payer: COMMERCIAL

## 2024-06-07 ENCOUNTER — ANESTHESIA EVENT (OUTPATIENT)
Dept: SURGERY | Facility: HOSPITAL | Age: 39
End: 2024-06-07
Payer: COMMERCIAL

## 2024-06-07 VITALS
SYSTOLIC BLOOD PRESSURE: 130 MMHG | HEIGHT: 64 IN | OXYGEN SATURATION: 97 % | RESPIRATION RATE: 16 BRPM | HEART RATE: 76 BPM | WEIGHT: 175 LBS | TEMPERATURE: 99.2 F | DIASTOLIC BLOOD PRESSURE: 65 MMHG | BODY MASS INDEX: 29.88 KG/M2

## 2024-06-07 LAB
ALBUMIN SERPL BCG-MCNC: 3.9 G/DL (ref 3.5–5.2)
ALP SERPL-CCNC: 67 U/L (ref 40–150)
ALT SERPL W P-5'-P-CCNC: 39 U/L (ref 0–50)
ANION GAP SERPL CALCULATED.3IONS-SCNC: 7 MMOL/L (ref 7–15)
AST SERPL W P-5'-P-CCNC: 57 U/L (ref 0–45)
BASOPHILS # BLD AUTO: 0 10E3/UL (ref 0–0.2)
BASOPHILS NFR BLD AUTO: 2 %
BILIRUB SERPL-MCNC: 0.6 MG/DL
BUN SERPL-MCNC: 6.7 MG/DL (ref 6–20)
CALCIUM SERPL-MCNC: 8.5 MG/DL (ref 8.6–10)
CHLORIDE SERPL-SCNC: 103 MMOL/L (ref 98–107)
CREAT SERPL-MCNC: 0.77 MG/DL (ref 0.51–0.95)
DEPRECATED HCO3 PLAS-SCNC: 27 MMOL/L (ref 22–29)
EGFRCR SERPLBLD CKD-EPI 2021: >90 ML/MIN/1.73M2
EOSINOPHIL # BLD AUTO: 0 10E3/UL (ref 0–0.7)
EOSINOPHIL NFR BLD AUTO: 0 %
ERYTHROCYTE [DISTWIDTH] IN BLOOD BY AUTOMATED COUNT: 16.6 % (ref 10–15)
FERRITIN SERPL-MCNC: 59 NG/ML (ref 6–175)
GLUCOSE SERPL-MCNC: 107 MG/DL (ref 70–99)
HCT VFR BLD AUTO: 34.8 % (ref 35–47)
HGB BLD-MCNC: 10.4 G/DL (ref 11.7–15.7)
IMM GRANULOCYTES # BLD: 0 10E3/UL
IMM GRANULOCYTES NFR BLD: 0 %
LYMPHOCYTES # BLD AUTO: 0.6 10E3/UL (ref 0.8–5.3)
LYMPHOCYTES NFR BLD AUTO: 23 %
MCH RBC QN AUTO: 24.8 PG (ref 26.5–33)
MCHC RBC AUTO-ENTMCNC: 29.9 G/DL (ref 31.5–36.5)
MCV RBC AUTO: 83 FL (ref 78–100)
MONOCYTES # BLD AUTO: 0.3 10E3/UL (ref 0–1.3)
MONOCYTES NFR BLD AUTO: 10 %
NEUTROPHILS # BLD AUTO: 1.8 10E3/UL (ref 1.6–8.3)
NEUTROPHILS NFR BLD AUTO: 65 %
NRBC # BLD AUTO: 0 10E3/UL
NRBC BLD AUTO-RTO: 0 /100
PLATELET # BLD AUTO: 205 10E3/UL (ref 150–450)
POTASSIUM SERPL-SCNC: 4.2 MMOL/L (ref 3.4–5.3)
PROT SERPL-MCNC: 6.8 G/DL (ref 6.4–8.3)
RBC # BLD AUTO: 4.2 10E6/UL (ref 3.8–5.2)
SODIUM SERPL-SCNC: 137 MMOL/L (ref 135–145)
WBC # BLD AUTO: 2.7 10E3/UL (ref 4–11)

## 2024-06-07 PROCEDURE — 250N000011 HC RX IP 250 OP 636: Mod: JZ | Performed by: ANESTHESIOLOGY

## 2024-06-07 PROCEDURE — 258N000003 HC RX IP 258 OP 636: Mod: JZ | Performed by: ANESTHESIOLOGY

## 2024-06-07 PROCEDURE — 82728 ASSAY OF FERRITIN: CPT | Performed by: STUDENT IN AN ORGANIZED HEALTH CARE EDUCATION/TRAINING PROGRAM

## 2024-06-07 PROCEDURE — 250N000013 HC RX MED GY IP 250 OP 250 PS 637

## 2024-06-07 PROCEDURE — 255N000002 HC RX 255 OP 636: Performed by: SPECIALIST

## 2024-06-07 PROCEDURE — 250N000011 HC RX IP 250 OP 636: Mod: JZ

## 2024-06-07 PROCEDURE — 999N000180 XR SURGERY CARM FLUORO LESS THAN 5 MIN: Mod: TC

## 2024-06-07 PROCEDURE — 272N000001 HC OR GENERAL SUPPLY STERILE: Performed by: SPECIALIST

## 2024-06-07 PROCEDURE — 99239 HOSP IP/OBS DSCHRG MGMT >30: CPT | Performed by: STUDENT IN AN ORGANIZED HEALTH CARE EDUCATION/TRAINING PROGRAM

## 2024-06-07 PROCEDURE — 999N000141 HC STATISTIC PRE-PROCEDURE NURSING ASSESSMENT: Performed by: SPECIALIST

## 2024-06-07 PROCEDURE — 96376 TX/PRO/DX INJ SAME DRUG ADON: CPT | Mod: 59

## 2024-06-07 PROCEDURE — 80053 COMPREHEN METABOLIC PANEL: CPT

## 2024-06-07 PROCEDURE — 250N000011 HC RX IP 250 OP 636: Performed by: SPECIALIST

## 2024-06-07 PROCEDURE — 47563 LAPARO CHOLECYSTECTOMY/GRAPH: CPT | Performed by: SPECIALIST

## 2024-06-07 PROCEDURE — 99204 OFFICE O/P NEW MOD 45 MIN: CPT | Mod: 57 | Performed by: SPECIALIST

## 2024-06-07 PROCEDURE — 710N000012 HC RECOVERY PHASE 2, PER MINUTE: Performed by: SPECIALIST

## 2024-06-07 PROCEDURE — 258N000003 HC RX IP 258 OP 636: Performed by: SPECIALIST

## 2024-06-07 PROCEDURE — 250N000011 HC RX IP 250 OP 636: Performed by: NURSE ANESTHETIST, CERTIFIED REGISTERED

## 2024-06-07 PROCEDURE — 88304 TISSUE EXAM BY PATHOLOGIST: CPT | Mod: TC | Performed by: SPECIALIST

## 2024-06-07 PROCEDURE — 370N000017 HC ANESTHESIA TECHNICAL FEE, PER MIN: Performed by: SPECIALIST

## 2024-06-07 PROCEDURE — 250N000009 HC RX 250: Performed by: NURSE ANESTHETIST, CERTIFIED REGISTERED

## 2024-06-07 PROCEDURE — 36415 COLL VENOUS BLD VENIPUNCTURE: CPT

## 2024-06-07 PROCEDURE — 85025 COMPLETE CBC W/AUTO DIFF WBC: CPT

## 2024-06-07 PROCEDURE — 49591 RPR AA HRN 1ST < 3 CM RDC: CPT | Mod: 59 | Performed by: SPECIALIST

## 2024-06-07 PROCEDURE — 710N000009 HC RECOVERY PHASE 1, LEVEL 1, PER MIN: Performed by: SPECIALIST

## 2024-06-07 PROCEDURE — 250N000025 HC SEVOFLURANE, PER MIN: Performed by: SPECIALIST

## 2024-06-07 PROCEDURE — G0378 HOSPITAL OBSERVATION PER HR: HCPCS

## 2024-06-07 PROCEDURE — 250N000013 HC RX MED GY IP 250 OP 250 PS 637: Performed by: SPECIALIST

## 2024-06-07 PROCEDURE — 360N000084 HC SURGERY LEVEL 4 W/ FLUORO, PER MIN: Performed by: SPECIALIST

## 2024-06-07 PROCEDURE — 250N000009 HC RX 250: Performed by: SPECIALIST

## 2024-06-07 RX ORDER — DEXAMETHASONE SODIUM PHOSPHATE 4 MG/ML
4 INJECTION, SOLUTION INTRA-ARTICULAR; INTRALESIONAL; INTRAMUSCULAR; INTRAVENOUS; SOFT TISSUE
Status: DISCONTINUED | OUTPATIENT
Start: 2024-06-07 | End: 2024-06-07 | Stop reason: HOSPADM

## 2024-06-07 RX ORDER — SODIUM CHLORIDE, SODIUM LACTATE, POTASSIUM CHLORIDE, AND CALCIUM CHLORIDE .6; .31; .03; .02 G/100ML; G/100ML; G/100ML; G/100ML
IRRIGANT IRRIGATION PRN
Status: DISCONTINUED | OUTPATIENT
Start: 2024-06-07 | End: 2024-06-07 | Stop reason: HOSPADM

## 2024-06-07 RX ORDER — SODIUM CHLORIDE 9 MG/ML
INJECTION, SOLUTION INTRAVENOUS CONTINUOUS
Status: DISCONTINUED | OUTPATIENT
Start: 2024-06-07 | End: 2024-06-07 | Stop reason: HOSPADM

## 2024-06-07 RX ORDER — FENTANYL CITRATE 50 UG/ML
25 INJECTION, SOLUTION INTRAMUSCULAR; INTRAVENOUS EVERY 5 MIN PRN
Status: DISCONTINUED | OUTPATIENT
Start: 2024-06-07 | End: 2024-06-07 | Stop reason: HOSPADM

## 2024-06-07 RX ORDER — MAGNESIUM HYDROXIDE 1200 MG/15ML
LIQUID ORAL PRN
Status: DISCONTINUED | OUTPATIENT
Start: 2024-06-07 | End: 2024-06-07 | Stop reason: HOSPADM

## 2024-06-07 RX ORDER — CEFAZOLIN SODIUM/WATER 2 G/20 ML
2 SYRINGE (ML) INTRAVENOUS
Status: COMPLETED | OUTPATIENT
Start: 2024-06-07 | End: 2024-06-07

## 2024-06-07 RX ORDER — ONDANSETRON 4 MG/1
4 TABLET, ORALLY DISINTEGRATING ORAL EVERY 30 MIN PRN
Status: DISCONTINUED | OUTPATIENT
Start: 2024-06-07 | End: 2024-06-07 | Stop reason: HOSPADM

## 2024-06-07 RX ORDER — SODIUM CHLORIDE, SODIUM LACTATE, POTASSIUM CHLORIDE, CALCIUM CHLORIDE 600; 310; 30; 20 MG/100ML; MG/100ML; MG/100ML; MG/100ML
INJECTION, SOLUTION INTRAVENOUS CONTINUOUS
Status: DISCONTINUED | OUTPATIENT
Start: 2024-06-07 | End: 2024-06-07 | Stop reason: HOSPADM

## 2024-06-07 RX ORDER — NALOXONE HYDROCHLORIDE 0.4 MG/ML
0.1 INJECTION, SOLUTION INTRAMUSCULAR; INTRAVENOUS; SUBCUTANEOUS
Status: DISCONTINUED | OUTPATIENT
Start: 2024-06-07 | End: 2024-06-07 | Stop reason: HOSPADM

## 2024-06-07 RX ORDER — ACETAMINOPHEN 325 MG/1
975 TABLET ORAL ONCE
Status: DISCONTINUED | OUTPATIENT
Start: 2024-06-07 | End: 2024-06-07 | Stop reason: HOSPADM

## 2024-06-07 RX ORDER — SODIUM CHLORIDE, SODIUM LACTATE, POTASSIUM CHLORIDE, CALCIUM CHLORIDE 600; 310; 30; 20 MG/100ML; MG/100ML; MG/100ML; MG/100ML
INJECTION, SOLUTION INTRAVENOUS CONTINUOUS
Status: DISCONTINUED | OUTPATIENT
Start: 2024-06-07 | End: 2024-06-07

## 2024-06-07 RX ORDER — OXYCODONE HCL 5 MG/5 ML
5 SOLUTION, ORAL ORAL
Status: DISCONTINUED | OUTPATIENT
Start: 2024-06-07 | End: 2024-06-07 | Stop reason: HOSPADM

## 2024-06-07 RX ORDER — FENTANYL CITRATE 50 UG/ML
50 INJECTION, SOLUTION INTRAMUSCULAR; INTRAVENOUS
Status: DISCONTINUED | OUTPATIENT
Start: 2024-06-07 | End: 2024-06-07 | Stop reason: HOSPADM

## 2024-06-07 RX ORDER — HYDROMORPHONE HCL IN WATER/PF 6 MG/30 ML
0.2 PATIENT CONTROLLED ANALGESIA SYRINGE INTRAVENOUS EVERY 5 MIN PRN
Status: DISCONTINUED | OUTPATIENT
Start: 2024-06-07 | End: 2024-06-07 | Stop reason: HOSPADM

## 2024-06-07 RX ORDER — LIDOCAINE HYDROCHLORIDE 10 MG/ML
INJECTION, SOLUTION INFILTRATION; PERINEURAL PRN
Status: DISCONTINUED | OUTPATIENT
Start: 2024-06-07 | End: 2024-06-07

## 2024-06-07 RX ORDER — PROPOFOL 10 MG/ML
INJECTION, EMULSION INTRAVENOUS PRN
Status: DISCONTINUED | OUTPATIENT
Start: 2024-06-07 | End: 2024-06-07

## 2024-06-07 RX ORDER — OXYCODONE HCL 5 MG/5 ML
10 SOLUTION, ORAL ORAL
Status: DISCONTINUED | OUTPATIENT
Start: 2024-06-07 | End: 2024-06-07 | Stop reason: HOSPADM

## 2024-06-07 RX ORDER — FENTANYL CITRATE 50 UG/ML
50 INJECTION, SOLUTION INTRAMUSCULAR; INTRAVENOUS EVERY 5 MIN PRN
Status: DISCONTINUED | OUTPATIENT
Start: 2024-06-07 | End: 2024-06-07 | Stop reason: HOSPADM

## 2024-06-07 RX ORDER — ONDANSETRON 2 MG/ML
4 INJECTION INTRAMUSCULAR; INTRAVENOUS EVERY 30 MIN PRN
Status: DISCONTINUED | OUTPATIENT
Start: 2024-06-07 | End: 2024-06-07 | Stop reason: HOSPADM

## 2024-06-07 RX ORDER — LIDOCAINE 40 MG/G
CREAM TOPICAL
Status: DISCONTINUED | OUTPATIENT
Start: 2024-06-07 | End: 2024-06-07 | Stop reason: HOSPADM

## 2024-06-07 RX ORDER — BUPIVACAINE HYDROCHLORIDE 2.5 MG/ML
INJECTION, SOLUTION INFILTRATION; PERINEURAL PRN
Status: DISCONTINUED | OUTPATIENT
Start: 2024-06-07 | End: 2024-06-07 | Stop reason: HOSPADM

## 2024-06-07 RX ORDER — IBUPROFEN 200 MG
600 TABLET ORAL
Status: DISCONTINUED | OUTPATIENT
Start: 2024-06-07 | End: 2024-06-07 | Stop reason: HOSPADM

## 2024-06-07 RX ORDER — FENTANYL CITRATE 50 UG/ML
INJECTION, SOLUTION INTRAMUSCULAR; INTRAVENOUS PRN
Status: DISCONTINUED | OUTPATIENT
Start: 2024-06-07 | End: 2024-06-07

## 2024-06-07 RX ORDER — ACETAMINOPHEN 325 MG/1
975 TABLET ORAL ONCE
Status: COMPLETED | OUTPATIENT
Start: 2024-06-07 | End: 2024-06-07

## 2024-06-07 RX ORDER — KETAMINE HYDROCHLORIDE 10 MG/ML
INJECTION INTRAMUSCULAR; INTRAVENOUS PRN
Status: DISCONTINUED | OUTPATIENT
Start: 2024-06-07 | End: 2024-06-07

## 2024-06-07 RX ORDER — HYDROCODONE BITARTRATE AND ACETAMINOPHEN 5; 325 MG/1; MG/1
1 TABLET ORAL
Status: COMPLETED | OUTPATIENT
Start: 2024-06-07 | End: 2024-06-07

## 2024-06-07 RX ORDER — HYDROMORPHONE HCL IN WATER/PF 6 MG/30 ML
0.4 PATIENT CONTROLLED ANALGESIA SYRINGE INTRAVENOUS EVERY 5 MIN PRN
Status: DISCONTINUED | OUTPATIENT
Start: 2024-06-07 | End: 2024-06-07 | Stop reason: HOSPADM

## 2024-06-07 RX ORDER — ONDANSETRON 2 MG/ML
INJECTION INTRAMUSCULAR; INTRAVENOUS PRN
Status: DISCONTINUED | OUTPATIENT
Start: 2024-06-07 | End: 2024-06-07

## 2024-06-07 RX ORDER — HYDROCODONE BITARTRATE AND ACETAMINOPHEN 5; 325 MG/1; MG/1
1-2 TABLET ORAL EVERY 4 HOURS PRN
Qty: 20 TABLET | Refills: 0 | Status: SHIPPED | OUTPATIENT
Start: 2024-06-07

## 2024-06-07 RX ORDER — DEXAMETHASONE SODIUM PHOSPHATE 10 MG/ML
INJECTION, SOLUTION INTRAMUSCULAR; INTRAVENOUS PRN
Status: DISCONTINUED | OUTPATIENT
Start: 2024-06-07 | End: 2024-06-07

## 2024-06-07 RX ADMIN — ACETAMINOPHEN 975 MG: 325 TABLET ORAL at 08:34

## 2024-06-07 RX ADMIN — DEXAMETHASONE SODIUM PHOSPHATE 10 MG: 10 INJECTION, SOLUTION INTRAMUSCULAR; INTRAVENOUS at 09:15

## 2024-06-07 RX ADMIN — PROPOFOL 100 MG: 10 INJECTION, EMULSION INTRAVENOUS at 09:15

## 2024-06-07 RX ADMIN — FENTANYL CITRATE 100 MCG: 50 INJECTION INTRAMUSCULAR; INTRAVENOUS at 09:15

## 2024-06-07 RX ADMIN — ACETAMINOPHEN 650 MG: 325 TABLET ORAL at 03:08

## 2024-06-07 RX ADMIN — FENTANYL CITRATE 50 MCG: 50 INJECTION INTRAMUSCULAR; INTRAVENOUS at 09:37

## 2024-06-07 RX ADMIN — PIPERACILLIN AND TAZOBACTAM 3.38 G: 3; .375 INJECTION, POWDER, FOR SOLUTION INTRAVENOUS at 06:40

## 2024-06-07 RX ADMIN — HYDROMORPHONE HYDROCHLORIDE 0.4 MG: 0.2 INJECTION, SOLUTION INTRAMUSCULAR; INTRAVENOUS; SUBCUTANEOUS at 11:07

## 2024-06-07 RX ADMIN — LIDOCAINE HYDROCHLORIDE 5 ML: 10 INJECTION, SOLUTION INFILTRATION; PERINEURAL at 09:15

## 2024-06-07 RX ADMIN — FENTANYL CITRATE 50 MCG: 50 INJECTION, SOLUTION INTRAMUSCULAR; INTRAVENOUS at 10:46

## 2024-06-07 RX ADMIN — ONDANSETRON 4 MG: 2 INJECTION INTRAMUSCULAR; INTRAVENOUS at 09:15

## 2024-06-07 RX ADMIN — HYDROCODONE BITARTRATE AND ACETAMINOPHEN 1 TABLET: 5; 325 TABLET ORAL at 12:24

## 2024-06-07 RX ADMIN — FENTANYL CITRATE 50 MCG: 50 INJECTION, SOLUTION INTRAMUSCULAR; INTRAVENOUS at 10:52

## 2024-06-07 RX ADMIN — HYDROMORPHONE HYDROCHLORIDE 0.2 MG: 0.2 INJECTION, SOLUTION INTRAMUSCULAR; INTRAVENOUS; SUBCUTANEOUS at 03:11

## 2024-06-07 RX ADMIN — GABAPENTIN 100 MG: 100 CAPSULE ORAL at 06:40

## 2024-06-07 RX ADMIN — Medication 2 G: at 09:11

## 2024-06-07 RX ADMIN — ROCURONIUM BROMIDE 50 MG: 50 INJECTION, SOLUTION INTRAVENOUS at 09:15

## 2024-06-07 RX ADMIN — PIPERACILLIN AND TAZOBACTAM 3.38 G: 3; .375 INJECTION, POWDER, FOR SOLUTION INTRAVENOUS at 09:30

## 2024-06-07 RX ADMIN — SODIUM CHLORIDE, POTASSIUM CHLORIDE, SODIUM LACTATE AND CALCIUM CHLORIDE 100 ML/HR: 600; 310; 30; 20 INJECTION, SOLUTION INTRAVENOUS at 11:19

## 2024-06-07 RX ADMIN — SUGAMMADEX 200 MG: 100 INJECTION, SOLUTION INTRAVENOUS at 10:07

## 2024-06-07 RX ADMIN — HYDROMORPHONE HYDROCHLORIDE 0.5 MG: 1 INJECTION, SOLUTION INTRAMUSCULAR; INTRAVENOUS; SUBCUTANEOUS at 10:20

## 2024-06-07 RX ADMIN — KETAMINE HYDROCHLORIDE 50 MG: 10 INJECTION INTRAMUSCULAR; INTRAVENOUS at 09:15

## 2024-06-07 RX ADMIN — HYDROMORPHONE HYDROCHLORIDE 0.5 MG: 1 INJECTION, SOLUTION INTRAMUSCULAR; INTRAVENOUS; SUBCUTANEOUS at 10:00

## 2024-06-07 RX ADMIN — FENTANYL CITRATE 50 MCG: 50 INJECTION, SOLUTION INTRAMUSCULAR; INTRAVENOUS at 10:39

## 2024-06-07 RX ADMIN — MIDAZOLAM 2 MG: 1 INJECTION INTRAMUSCULAR; INTRAVENOUS at 09:15

## 2024-06-07 RX ADMIN — FENTANYL CITRATE 50 MCG: 50 INJECTION, SOLUTION INTRAMUSCULAR; INTRAVENOUS at 11:01

## 2024-06-07 RX ADMIN — SODIUM CHLORIDE: 9 INJECTION, SOLUTION INTRAVENOUS at 08:45

## 2024-06-07 RX ADMIN — FENTANYL CITRATE 50 MCG: 50 INJECTION INTRAMUSCULAR; INTRAVENOUS at 10:11

## 2024-06-07 ASSESSMENT — ACTIVITIES OF DAILY LIVING (ADL)
ADLS_ACUITY_SCORE: 35
ADLS_ACUITY_SCORE: 35
ADLS_ACUITY_SCORE: 18
ADLS_ACUITY_SCORE: 35
ADLS_ACUITY_SCORE: 18
ADLS_ACUITY_SCORE: 35
ADLS_ACUITY_SCORE: 18
ADLS_ACUITY_SCORE: 35

## 2024-06-07 NOTE — ANESTHESIA PROCEDURE NOTES
Airway         Procedure Start/Stop Times: 6/7/2024 9:15 AM and 6/7/2024 9:17 AM  Staff -        Anesthesiologist:  Trish Hall MD       CRNA: Jamarcus Aguilar APRN CRNA       Performed By: anesthesiologist  Consent for Airway        Urgency: elective  Indications and Patient Condition       Indications for airway management: raúl-procedural         Mask difficulty assessment: 1 - vent by mask    Final Airway Details       Final airway type: endotracheal airway       Successful airway: ETT - single  Endotracheal Airway Details        ETT size (mm): 7.0       Successful intubation technique: direct laryngoscopy       DL Blade Type: Lam 2       Grade View of Cords: 1       Adjucts: stylet       Position: Right       Measured from: gums/teeth       Secured at (cm): 21       Bite block used: None    Post intubation assessment        Placement verified by: capnometry, equal breath sounds and chest rise        Number of attempts at approach: 1       Number of other approaches attempted: 0       Secured with: tape       Ease of procedure: easy       Dentition: Intact    Medication(s) Administered   Medication Administration Time: 6/7/2024 9:15 AM

## 2024-06-07 NOTE — ANESTHESIA PREPROCEDURE EVALUATION
Anesthesia Pre-Procedure Evaluation    Patient: Kevon Wiseman   MRN: [id number] : 1985        Procedure : Procedure(s):  Laparoscopic Cholecystectomy  with Intra-operative Cholangiograms          Past Medical History:   Diagnosis Date    Asthma     PONV (postoperative nausea and vomiting)     Ulcer 2013    Bleeding ulcer    Unspecified closed fracture of ankle 2002    Left ankle/leg      Past Surgical History:   Procedure Laterality Date    ESOPHAGOSCOPY, GASTROSCOPY, DUODENOSCOPY (EGD), COMBINED N/A 2015    Procedure: COMBINED ESOPHAGOSCOPY, GASTROSCOPY, DUODENOSCOPY (EGD);  Surgeon: Duane, William Charles, MD;  Location: [facility] OR    GASTRIC BYPASS  2011    SURGICAL HISTORY OF -   x 2    PE Tubes    SURGICAL HISTORY OF -       Adenoidectomy    SURGICAL HISTORY OF -       (R) Tympanoplasty     SURGICAL HISTORY OF -       ankle    UPPER GI ENDOSCOPY        No Known Allergies   Social History     Tobacco Use    Smoking status: Every Day     Current packs/day: 0.00     Average packs/day: 0.5 packs/day for 3.0 years (1.5 ttl pk-yrs)     Types: Cigarettes     Start date: 2008     Last attempt to quit: 2011     Years since quittin.3    Smokeless tobacco: Not on file   Substance Use Topics    Alcohol use: Not Currently      Wt Readings from Last 1 Encounters:   24 79.4 kg (175 lb)        Anesthesia Evaluation   Pt has had prior anesthetic.     History of anesthetic complications  - PONV.      ROS/MED HX  ENT/Pulmonary:     (+)                      asthma                  Neurologic:       Cardiovascular:       METS/Exercise Tolerance:     Hematologic:       Musculoskeletal:       GI/Hepatic:       Renal/Genitourinary:       Endo:     (+)               Obesity,       Psychiatric/Substance Use:       Infectious Disease:       Malignancy:       Other:            Physical Exam    Airway        Mallampati: II    Neck ROM: full     Respiratory Devices and Support          Dental       (+) Minor Abnormalities - some fillings, tiny chips      Cardiovascular   cardiovascular exam normal          Pulmonary   pulmonary exam normal                OUTSIDE LABS:  CBC:   Lab Results   Component Value Date    WBC 2.7 (L) 06/07/2024    WBC 2.8 (L) 06/06/2024    HGB 10.4 (L) 06/07/2024    HGB 9.4 (L) 06/06/2024    HCT 34.8 (L) 06/07/2024    HCT 31.3 (L) 06/06/2024     06/07/2024     06/06/2024     BMP:   Lab Results   Component Value Date     06/07/2024     06/06/2024    POTASSIUM 4.2 06/07/2024    POTASSIUM 3.5 06/06/2024    CHLORIDE 103 06/07/2024    CHLORIDE 105 06/06/2024    CO2 27 06/07/2024    CO2 24 06/06/2024    BUN 6.7 06/07/2024    BUN 7.8 06/06/2024    CR 0.77 06/07/2024    CR 0.89 06/06/2024     (H) 06/07/2024     (H) 06/06/2024     COAGS:   Lab Results   Component Value Date    INR 0.96 02/15/2011     POC:   Lab Results   Component Value Date    HCG Negative 09/02/2010     HEPATIC:   Lab Results   Component Value Date    ALBUMIN 3.9 06/07/2024    PROTTOTAL 6.8 06/07/2024    ALT 39 06/07/2024    AST 57 (H) 06/07/2024    ALKPHOS 67 06/07/2024    BILITOTAL 0.6 06/07/2024     OTHER:   Lab Results   Component Value Date    A1C 5.0 05/13/2015    ESAU 8.5 (L) 06/07/2024    PHOS 3.9 12/07/2011    MAG 1.9 12/07/2011    TSH 0.91 07/28/2015    T4 1.03 02/15/2011       Anesthesia Plan    ASA Status:  2       Anesthesia Type: General.     - Airway: ETT              Consents    Anesthesia Plan(s) and associated risks, benefits, and realistic alternatives discussed. Questions answered and patient/representative(s) expressed understanding.     - Discussed: Risks, Benefits and Alternatives for the PROCEDURE were discussed     - Discussed with:  Patient      - Extended Intubation/Ventilatory Support Discussed: No.      - Patient is DNR/DNI Status: No     Use of blood products discussed: No .     Postoperative Care    Pain management: Multi-modal analgesia.  "  PONV prophylaxis: Ondansetron (or other 5HT-3), Dexamethasone or Solumedrol, Scopolamine patch     Comments:               Trish Hall MD    I have reviewed the pertinent notes and labs in the chart from the past 30 days and (re)examined the patient.  Any updates or changes from those notes are reflected in this note.       # Hypocalcemia: Lowest Ca = 8.3 mg/dL in last 2 days, will monitor and replace as appropriate        # Obesity: Estimated body mass index is 30.04 kg/m  as calculated from the following:    Height as of this encounter: 1.626 m (5' 4\").    Weight as of this encounter: 79.4 kg (175 lb).      "

## 2024-06-07 NOTE — CONSULTS
Consultation - Surgery  Kevon Wiseman,  1985, MRN [id number]    Admitting Dx: Cholecystitis [K81.9]    PCP: Emelia Crystal, [phone]   Code status:  Full Code       Extended Emergency Contact Information  Primary Emergency Contact: MEET WISEMAN   [place]  Mobile Phone: [phone]  Relation: Father  Secondary Emergency Contact: TYESHA RAMIREZ  Mobile Phone: [phone]  Relation: Significant other       Assessment and Plan   Impression:   Cholelithiasis with biliary colic.  LFTs are just slightly elevated but given her gastric bypass I think we need to do an intraoperative cholangiogram to be sure she does not have any thing in her common bile duct.  If her common bile duct looks normal, then likely she will be able to be discharged home immediately following surgery.  Risk and benefits of surgery explained to her and her partner.      Plan:  Laparoscopic cholecystectomy with intraoperative cholangiograms.            Chief Complaint <principal problem not specified>       HPI    We have been requested by the hospitalist service to evaluate Kevon Wiseman for cholelithiasis.  This is a 38 year old year old female who is status post gastric bypass many years ago who began having right upper quadrant pain 2 days ago.  Was persistent.  Was seen at the urgency room where an ultrasound and CT show gallstones with a normal common bile duct.  LFTs are slightly elevated.  She feels much better this morning.  She also has a small hernia.       Medical History  Patient Active Problem List   Diagnosis    Mild intermittent asthma    Obesity    BRCA1 positive    Irregular periods    Bariatric surgery status    Genital herpes    History of depression    Panic disorder without agoraphobia    Cholecystitis       [unfilled] Surgical History  Past Surgical History:   Procedure Laterality Date    ESOPHAGOSCOPY, GASTROSCOPY, DUODENOSCOPY (EGD), COMBINED N/A 2015    Procedure: COMBINED ESOPHAGOSCOPY,  GASTROSCOPY, DUODENOSCOPY (EGD);  Surgeon: Duane, William Charles, MD;  Location: [facility] OR    GASTRIC BYPASS  7/27/2011    SURGICAL HISTORY OF -   x 2    PE Tubes    SURGICAL HISTORY OF -       Adenoidectomy    SURGICAL HISTORY OF -       (R) Tympanoplasty     SURGICAL HISTORY OF -   2002    ankle    UPPER GI ENDOSCOPY          Social History  Social History     Substance Use Topics    Alcohol use: Yes    Drug use: No       Allergies  No Known Allergies Family History  Reviewed, and family history includes Breast Cancer (age of onset: 25) in an other family member; Breast Cancer (age of onset: 44) in her maternal aunt; Cancer (age of onset: 46) in her maternal aunt; Cancer (age of onset: 52) in her mother; Gastrointestinal Disease in her mother.  The Family history is not pertinent to the patients chief complaint. Psychosocial Needs  Social History     Social History Narrative    Currently unemployed, has been doing some babysitting.  Living with friends, looking for work    [employer]     Additional psychosocial needs reviewed per nursing assessment.     Prior to Admission Medications   Current Outpatient Medications   Medication Instructions    FLUoxetine (PROZAC) 20 mg, Oral, DAILY    gabapentin (NEURONTIN) 100 mg, Oral, 3 TIMES DAILY PRN    hydrOXYzine HCl (ATARAX) 10 mg, Oral, 3 TIMES DAILY PRN           Review of Systems:  Pertinent items are noted in HPI. Physical Exam:  Temp:  [98.9  F (37.2  C)-99.7  F (37.6  C)] 98.9  F (37.2  C)  Pulse:  [68-88] 68  Resp:  [16-18] 18  BP: (117-146)/(70-83) 117/80  SpO2:  [96 %-100 %] 96 %    General appearance: alert, appears stated age, and cooperative  Eyes: no abnormalities detected  Lungs: Breathing comfortably.  No shortness of breath  Heart: Regular.  Not tachycardic  Abdomen: Soft.  Nontender.  Small epigastric hernia.    Pulses: 2+ and symmetric  Skin: Skin color, texture, turgor normal. No rashes or lesions  Neurologic: Grossly normal       Pertinent Labs  Lab  Results: personally reviewed.   Lab Results   Component Value Date    WBC 2.7 06/07/2024    WBC 5.3 12/20/2017    HGB 10.4 06/07/2024    HGB 12.2 12/20/2017    HCT 34.8 06/07/2024    HCT 39.4 12/20/2017    MCV 83 06/07/2024    MCV 90 12/20/2017     06/07/2024     12/20/2017        Pertinent Radiology  Radiology Results: Personally reviewed impression/s  EKG Results: not reviewed.

## 2024-06-07 NOTE — PROGRESS NOTES
PRIMARY DIAGNOSIS: ACUTE PAIN  OUTPATIENT/OBSERVATION GOALS TO BE MET BEFORE DISCHARGE:  1. Pain Status: Improved but still requiring IV narcotics.    2. Return to near baseline physical activity: Yes    3. Cleared for discharge by consultants (if involved): No    Discharge Planner Nurse   Safe discharge environment identified: Yes  Barriers to discharge: Yes       Entered by: Jael Gómez RN 06/07/2024 5:04 AM   Neuro: A&Ox4.   Cardiac: Afebrile, VSS.   Respiratory: RA  GI/: Voiding spontaneously. No BM this shift.  Diet/appetite: NPO. Denies nausea.  Activity: Up with independent  Pain: Denies   Skin: No new deficits noted.  Lines: piv SL  Drains: No  Replacements: no     Please review provider order for any additional goals.   Nurse to notify provider when observation goals have been met and patient is ready for discharge.

## 2024-06-07 NOTE — DISCHARGE SUMMARY
"[facility]  Hospitalist Discharge Summary      Date of Admission:  6/6/2024  Date of Discharge:  6/7/2024  Discharging Provider: SHAY RUGGIERO MD  Discharge Service: Hospitalist Service    Discharge Diagnoses     #Acute biliary colic  #Chronic anemia  #Leukopenia  #Chronic periumbilical hernia  #LIZABETH  #History of gastric bypass  #BRCA1 gene mutation positive  #AUD in remission    Clinically Significant Risk Factors     # Obesity: Estimated body mass index is 30.04 kg/m  as calculated from the following:    Height as of this encounter: 1.626 m (5' 4\").    Weight as of this encounter: 79.4 kg (175 lb).       Follow-ups Needed After Discharge   Follow up with surgery team as needed    Unresulted Labs Ordered in the Past 30 Days of this Admission       Date and Time Order Name Status Description    6/7/2024 10:04 AM Surgical Pathology Exam In process     6/7/2024  8:12 AM Ferritin In process         These results will be followed up by surgeon, hospitalist    Discharge Disposition   Discharged to home  Condition at discharge: Stable    Hospital Course   Kevon Wiseman is a 38 year old female with PMHx of gastric bypass, LIZABETH, BRCA1 mutation who was admitted on 6/6/2024 for acute cholecysitis. She presented to the  for abdominal pain and myalgias. US concerning for acute cholecystitis; gen surg consulted.    #Acute biliary colic  -- CT AP: cholelithiasis   -- US: Gallstones within a contracted gallbladder with positive sonographic Milligan's sign. Hepatic steatosis   -- ALT/AST minimally elevated in UC, normal here; intra-op cholangiogram was normal   -- Lap mary successful on 6/7, discharged home by surgery    #Chronic anemia   #Leukopenia   Suspect leukopenia secondary to inflammation as above   -- Hgb 9.4 (down from 10.8 in the UC)  -- WBC 2.8 (down from 3.5 in the UC)  -- No s/s of bleeding   -- Patient does have a history of BRCA1 mutation, no masses seen on CT AP  -- Trend CBC; will " check diff again in the AM    #Chronic periumbilical hernia  Pt reports hx of known hernia and has been discussing this with her PCP; has been referred outpatient to surgery  -- CT AP: Tiny fat-containing paraumbilical hernia and a small fat-containing supraumbilical ventral hernia   -- continue outpatient follow-up    #LIZABETH  Patient states she has not been taking fluoxetine for about a month (med rec reports 1 week), will hold fluoxetine for now and instructed patient to follow-up with PCP on when/how to reinitiate  -- Holding as needed hydroxyzine (patient states she does not like taking this either)  -- Continue as needed gabapentin, patient states she takes this very infrequently    #H/o Gastric bypass (2011) - would avoid further NSAIDs     #BRCA1 gene mutation positive   #AUD in Remission     Consultations This Hospital Stay   PHARMACY TO DOSE VANCO  CARE MANAGEMENT / SOCIAL WORK IP CONSULT  SURGERY GENERAL IP CONSULT    Code Status   Full Code    Time Spent on this Encounter   I, SHAY RUGGIERO MD, discharged this patient today but I did not personally see the patient today and will not be billing for the patient's discharge.       SHAY RUGGIERO MD  [facility] PHASE II  [address] [place] [id number]  Phone: [phone]  Fax: [phone]  ______________________________________________________________________    Physical Exam   Vital Signs: Temp: 99.2  F (37.3  C) Temp src: Temporal BP: 117/66 Pulse: 71   Resp: 16 SpO2: 95 % O2 Device: None (Room air)    Weight: 175 lbs 0 oz         Primary Care Physician   [facility]    Discharge Orders      When to call - Contact Surgeon Team    You may experience symptoms that require follow-up before your scheduled appointment. Contact your Surgeon Team if you are concerned about pain control, large amount of bleeding, blood clots, constipation, or if you experience signs of infection (fever, growing tenderness at the  surgery site, a large amount of drainage, severe pain, foul-smelling drainage, redness or swelling.  Please call [phone] if you do have any questions or concerns     When to call - Reasons to Call 911    Call 911 immediately if you experience sudden-onset chest pain, arm weakness/numbness, slurred speech, or shortness of breath     Symptoms - Fever Management    A low grade fever can be expected after surgery. Your Provider many have prescribed an Opioid pain medication that also contains acetaminophen (TYLENOL) that may help with Fever management.  Do NOT take additional acetaminophen (TYLENOL) in combination with an Opioid/acetaminophen (TYLENOL) product. Read the labels on your Over The Counter (OTC) medications with care.     Diet Instructions    You may drink clear liquids (apple juice, ginger ale, 7-up, broth, etc.), and progress to your regular diet as you feel able. It is important to stay well-hydrated after surgery and drink plenty of water.     Shower/Bathing - Restrictions: Let water run over incisions and pat dry. No tub baths until bleeding stops.    Shower/Bathing - Restrictions: Let water run over incisions and pat dry. Do NOT soak in any body of water (lake, pool, bath, etc.) for 7-10 days postoperatively.     Dressing / Wound Care - Wound    Remove Band-Aids in 1 to 2 days.  Leave Steri-Strips for 7 to 10 days.  The sutures are underneath the skin and will dissolve on their own.     Discharge Instructions - Comfort and Pain Management    Pain after surgery is normal and expected. You will have some amount of pain after surgery. Your pain will improve with time. There are several things you can do to help reduce your pain including: rest, ice, and using pain medications as needed. Use pain interventions and don't wait until pain level is out of control. Contact your Surgeon Team if you have pain that persists or worsens after surgery despite rest, ice, and taking your medication(s) as  prescribed. You may have a dry mouth, a sore throat, muscles aches or trouble sleeping, and these symptoms should go away after 24 hours.     Discharge Instructions - Rest    Rest and relax for the next 24 hours. Make arrangements to have someone stay with you overnight, and avoid hazardous and strenuous activities.  Do NOT make any important decisions for the next 24 hours.     Return to normal activity as tolerated    Return to normal activity as tolerated     May return to work (WITHOUT restrictions)    May return to work as you feel up to it.  Everyone is different.  Average time off work is approximately 1 week.       Significant Results and Procedures   Most Recent 3 CBC's:  Recent Labs   Lab Test 06/07/24  0554 06/06/24 2013 12/20/17  1106   WBC 2.7* 2.8* 5.3   HGB 10.4* 9.4* 12.2   MCV 83 83 90    186 293     Most Recent 3 BMP's:  Recent Labs   Lab Test 06/07/24  0554 06/06/24 2013 06/20/16  0000    137  --    POTASSIUM 4.2 3.5  --    CHLORIDE 103 105  --    CO2 27 24  --    BUN 6.7 7.8  --    CR 0.77 0.89 0.82   ANIONGAP 7 8  --    ESAU 8.5* 8.3*  --    * 102*  --    ,   Results for orders placed or performed during the hospital encounter of 06/06/24   XR Surgery LUI L/T 5 Min Fluoro    Narrative    This exam was marked as non-reportable because it will not be read by a   radiologist or a [place] non-radiologist provider.             Discharge Medications   Current Discharge Medication List        START taking these medications    Details   HYDROcodone-acetaminophen (NORCO) 5-325 MG tablet Take 1-2 tablets by mouth every 4 hours as needed for moderate to severe pain  Qty: 20 tablet, Refills: 0    Associated Diagnoses: Cholecystitis           CONTINUE these medications which have NOT CHANGED    Details   FLUoxetine (PROZAC) 20 MG capsule Take 20 mg by mouth daily      gabapentin (NEURONTIN) 100 MG capsule Take 100 mg by mouth 3 times daily as needed for neuropathic pain       hydrOXYzine HCl (ATARAX) 10 MG tablet Take 10 mg by mouth 3 times daily as needed for anxiety           Allergies   No Known Allergies

## 2024-06-07 NOTE — PROGRESS NOTES
PRIMARY DIAGNOSIS: ACUTE PAIN  OUTPATIENT/OBSERVATION GOALS TO BE MET BEFORE DISCHARGE:  1. Pain Status: Improved but still requiring IV narcotics.    2. Return to near baseline physical activity: Yes    3. Cleared for discharge by consultants (if involved): No    Discharge Planner Nurse   Safe discharge environment identified: Yes  Barriers to discharge: Yes       Entered by: Jael Gómez RN 06/07/2024 5:03 AM     Please review provider order for any additional goals.   Nurse to notify provider when observation goals have been met and patient is ready for discharge.

## 2024-06-07 NOTE — PLAN OF CARE
PRIMARY DIAGNOSIS: ACUTE PAIN  OUTPATIENT/OBSERVATION GOALS TO BE MET BEFORE DISCHARGE:  1. Pain Status: Improved but still requiring IV narcotics.    2. Return to near baseline physical activity: Yes    3. Cleared for discharge by consultants (if involved): No    Discharge Planner Nurse   Safe discharge environment identified: Yes  Barriers to discharge: Yes       Entered by: Jael Gómez RN 06/06/2024 10:54 PM     Please review provider order for any additional goals.   Nurse to notify provider when observation goals have been met and patient is ready for discharge.Goal Outcome Evaluation:

## 2024-06-07 NOTE — ANESTHESIA CARE TRANSFER NOTE
Patient: Kevon Wiseman    Procedure: Procedure(s):  Laparoscopic Cholecystectomy, Repair ventral Hernia  with Intra-operative Cholangiograms       Diagnosis: Calculus of gallbladder without cholecystitis without obstruction [K80.20]  Diagnosis Additional Information: No value filed.    Anesthesia Type:   General     Note:    Oropharynx: oropharynx clear of all foreign objects and spontaneously breathing  Level of Consciousness: awake  Oxygen Supplementation: room air    Independent Airway: airway patency satisfactory and stable  Dentition: dentition unchanged  Vital Signs Stable: post-procedure vital signs reviewed and stable  Report to RN Given: handoff report given  Patient transferred to: PACU    Handoff Report: Identifed the Patient, Identified the Reponsible Provider, Reviewed the pertinent medical history, Discussed the surgical course, Reviewed Intra-OP anesthesia mangement and issues during anesthesia, Set expectations for post-procedure period and Allowed opportunity for questions and acknowledgement of understanding      Vitals:  Vitals Value Taken Time   /78 06/07/24 1032   Temp 99.2 06/07/24 1032      Pulse 91 06/07/24 1034   Resp 16 06/07/24 1034   SpO2 98 % 06/07/24 1034   Vitals shown include unfiled device data.    Electronically Signed By: SARAY Alvarenga CRNA  June 7, 2024  10:36 AM

## 2024-06-07 NOTE — OP NOTE
Operative Note    Name:  Kevon Wiseman  PCP:  [facility]  Procedure Date:  6/7/2024       Procedure:  Procedure(s):  Laparoscopic Cholecystectomy, Repair ventral Hernia  with Intra-operative Cholangiograms     Pre-Procedure Diagnosis:  Calculus of gallbladder without cholecystitis without obstruction [K80.20]     Post-Procedure Diagnosis:    Same     Surgeon(s):  Rose Mary Reina MD     Assistant: None      Anesthesia Type:  General     Indications:  Young woman with abdominal pain stones and a slightly elevated liver numbers.  So has a epigastric hernia.      Findings:   Normal intraoperative cholangiogram.    Operative Report:    The patient was brought to the operating suite where she was placed in the supine position.  She was prepped and draped in a sterile fashion after general anesthesia was administered.  A small incision was made below the umbilicus and the Veress needle passed into the abdominal cavity which was insufflated with carbon dioxide.  A 5 mm trocar port was then placed followed by the camera.  Under direct vision a 10 mm port was placed in the epigastric region through her known hernia.  Two 5 mm ports were placed in the right upper quadrant under direct vision.  With traction on the gallbladder dissection was carried out in the [place] and the cystic duct and artery were easily identified and dissected free.  A clip was placed up against the gallbladder and a small enterotomy made in the cystic duct.  Cholangiocatheter was passed through separate stab incision and passed into the duct and held firm with a clip.  Cholangiograms were obtained which showed no filling defect and good flow into the duodenum.   The camera was then replaced and the catheter removed from the duct.  The duct was then clipped and divided as was the artery.  The gallbladder was removed from the gallbladder bed with electrocautery with no difficulty and pulled up through the epigastric incision.   The  port was replaced and the abdomen was irrigated until clear.  Hemostasis was assured.   I then extended the epigastric incision just slightly and was able to pull up through this a fairly large hernia sac that was just preperitoneal fat.  Much of this was transected with electrocautery and the remainder reduced.  Then using the fascial closure device I closed the fascia with interrupted 0 Vicryl sutures. The facial defect was 2.5 cm in size.  All  the ports were removed. And each site closed with subcuticular sutures of 4-0 Monocryl.  Each site was infiltrated with quarter percent Marcaine.  Sterile dressings were placed.  The patient tolerated the procedure well.      Estimated Blood Loss:   5 cc    Specimens:    ID Type Source Tests Collected by Time Destination   1 : GALLBLADDER Tissue Gallbladder SURGICAL PATHOLOGY EXAM Rose Mary Reina MD 6/7/2024  9:58 AM            Drains:        Complications:    None    Rose Mary Reina MD     Date: 6/7/2024  Time: 10:25 AM

## 2024-06-07 NOTE — PROGRESS NOTES
Patient admitted to room 11 at approximately 07 via wheel chair from emergency room.  Reason for Admission:   Report received from:   Patient was accompanied by Self.  Discharge transportation provided by:  Patient ambulated/transferred:  independently. self.  Patient is alert and orientated x 4.  Outpatient Observation education provided to: (patient, family, friend)  MDRO Education done if applicable (MRSA, VRE, etc)  Safety risks were identified during admission:  none.   Yellow risk/fall band applied:  No  Detailed Belongings: cell phone, clothes

## 2024-06-07 NOTE — ANESTHESIA POSTPROCEDURE EVALUATION
Patient: Kevon Wiseman    Procedure: Procedure(s):  Laparoscopic Cholecystectomy, Repair ventral Hernia  with Intra-operative Cholangiograms       Anesthesia Type:  General    Note:  Disposition: Outpatient   Postop Pain Control: Uneventful            Sign Out: Well controlled pain   PONV: No   Neuro/Psych: Uneventful            Sign Out: Acceptable/Baseline neuro status   Airway/Respiratory: Uneventful            Sign Out: Acceptable/Baseline resp. status   CV/Hemodynamics: Uneventful            Sign Out: Acceptable CV status; No obvious hypovolemia; No obvious fluid overload   Other NRE: NONE   DID A NON-ROUTINE EVENT OCCUR? No    Event details/Postop Comments:    Chart review           Last vitals:  Vitals Value Taken Time   /70 06/07/24 1130   Temp 37.3  C (99.2  F) 06/07/24 1030   Pulse 74 06/07/24 1134   Resp 20 06/07/24 1134   SpO2 99 % 06/07/24 1134   Vitals shown include unfiled device data.    Electronically Signed By: Sandra Quinteros MD  June 7, 2024  5:54 PM

## 2024-06-08 ENCOUNTER — PATIENT OUTREACH (OUTPATIENT)
Dept: CARE COORDINATION | Facility: CLINIC | Age: 39
End: 2024-06-08
Payer: COMMERCIAL

## 2024-06-08 NOTE — PROGRESS NOTES
[facility]: Transitions of Care Outreach  Chief Complaint   Patient presents with    Clinic Care Coordination - Post Hospital       Most Recent Admission Date: 6/6/2024   Most Recent Admission Diagnosis: Cholecystitis - K81.9     Most Recent Discharge Date: 6/7/2024   Most Recent Discharge Diagnosis: Cholecystitis - K81.9     Transitions of Care Assessment    Discharge Assessment  How are you doing now that you are home?: Feels okay now, but was feeling feverish last night.  She called the ED last night & it was recommended that she return to the hospital.  Instead, patient took Tylenol & had a popsicle, then her fever broke.  She currently staying in [place].  How are your symptoms? (Red Flag symptoms escalate to triage hotline per guidelines): Improved  Do you know how to contact your clinic care team if you have future questions or changes to your health status? : Yes  Does the patient have their discharge instructions? : Yes  Does the patient have questions regarding their discharge instructions? : No  Were you started on any new medications or were there changes to any of your previous medications? : Yes  Does the patient have all of their medications?: Yes  Do you have questions regarding any of your medications? : No  Do you have all of your needed medical supplies or equipment (DME)?  (i.e. oxygen tank, CPAP, cane, etc.): Yes    Post-op ([facility] CTA Only)  If the patient had a surgery or procedure, do they have any questions for a nurse?: No    Follow up Plan     Discharge Follow-Up  Discharge follow up appointment scheduled in alignment with recommended follow up timeframe or Transitions of Risk Category? (Low = within 30 days; Moderate= within 14 days; High= within 7 days): No  Patient's follow up appointment not scheduled: Patient declined scheduling support. Education on the importance of transitions of care follow up. Provided scheduling phone number. (Patient is currently staying in  [place], so she is a distance away form her PCP.  Will make appt when she knows that she will be back in town.)    No future appointments.    Outpatient Plan as outlined on AVS reviewed with patient.    For any urgent concerns, please contact our 24 hour nurse triage line: [phone] ([phone])       Rose Mary Perez, [facility]  [phone]  [facility]

## 2024-06-11 ENCOUNTER — TELEPHONE (OUTPATIENT)
Dept: SURGERY | Facility: CLINIC | Age: 39
End: 2024-06-11
Payer: COMMERCIAL

## 2024-06-11 NOTE — TELEPHONE ENCOUNTER
[facility] Post-Op Phone Call                    Surgeon: Rose Mary Reina MD    Surgery: Laparoscopic Cholecystectomy and Ventral Hernia Repair  Date of Surgery: 6/7/2024  Discharge Date: 6/7/2024    Date/Time Called:   Date: 6/11/2024 Time: 10:12 AM   Attempt: First    Pain Control:  Intensity: Mild (1 - 3)  Duration/Location/Explain: Incision site pain that is starting to get better. She is currently taking Tylenol throughout the day and then a dose of Norco in the evening.     Incisions:  Drainage? clean and dry  Any fever type symptoms? No  Comment: No concerns with incision site.    GI:  Nausea? No  Vomiting? No  BM? Yes  Gas? Yes  Voiding Frequency? 4 or more/day   Appetite? Good    Activity:  Walking activity? Yes  Frequency/Type: Ambulating frequently as tolerated.  Restrictions: 20 pound weight restrictions for 2 weeks.    Return to Work Plans?  Patient currently not working.    Post-op appointment made?  Patient has been in contact with PCP regarding follow-up at this time.      Thank you for your time. Please do not hesitate to call us with any questions or concerns.    Call completed by: Tarsha CHAUHAN RN

## 2024-06-12 LAB
PATH REPORT.COMMENTS IMP SPEC: NORMAL
PATH REPORT.COMMENTS IMP SPEC: NORMAL
PATH REPORT.FINAL DX SPEC: NORMAL
PATH REPORT.GROSS SPEC: NORMAL
PATH REPORT.MICROSCOPIC SPEC OTHER STN: NORMAL
PATH REPORT.RELEVANT HX SPEC: NORMAL
PHOTO IMAGE: NORMAL

## 2024-06-12 PROCEDURE — 88304 TISSUE EXAM BY PATHOLOGIST: CPT | Mod: 26 | Performed by: PATHOLOGY

## 2024-06-16 ENCOUNTER — HEALTH MAINTENANCE LETTER (OUTPATIENT)
Age: 39
End: 2024-06-16

## (undated) DEVICE — PREP CHLORAPREP 26ML TINTED HI-LITE ORANGE 930815

## (undated) DEVICE — ENDO SHEARS RENEW LAP ENDOCUT SCISSOR TIP 16.5MM 3142

## (undated) DEVICE — GLOVE PI ULTRATCH M LF SZ 6.5 PF CUFF TEXT STRL LF 42665

## (undated) DEVICE — SUTURE PASSOR W/GUIDE RSG-14F-4-WG

## (undated) DEVICE — STOPCOCK 3 WAY 500 PSI M3SNC

## (undated) DEVICE — DRAPE C-ARM 60X42" 1013

## (undated) DEVICE — CUSTOM PACK LAP CHOLE SBA5BLCHEA

## (undated) DEVICE — SOL RINGERS LACTATED 1000ML BAG 2B2324X

## (undated) DEVICE — SYR 30ML LL W/O NDL 302832

## (undated) DEVICE — DECANTER VIAL 2006S

## (undated) DEVICE — CLIP APPLIER ENDO ROTATING 10MM MED/LG ER320

## (undated) DEVICE — CATH CHOLANGIOGRAM 4.5FR TAUT METAL TIP 20018-M55

## (undated) DEVICE — SUCTION MANIFOLD NEPTUNE 2 SYS 1 PORT 702-025-000

## (undated) DEVICE — SUTURE VICRYL+ 0 27IN CT-1 UND VCP260H

## (undated) DEVICE — SUCTION STRYKERFLOW II 250-070-500

## (undated) DEVICE — CONTAINER URINE SPEC 4OZ STRL 1053

## (undated) DEVICE — ENDO TROCAR FIRST ENTRY KII FIOS Z-THRD 11X100MM CTF33

## (undated) DEVICE — NDL INSUFFLATION 13GA 120MM C2201

## (undated) DEVICE — CATH IV 14GA 2IN REM FLASHPLUG DEHP-FR PVC FR 4251717-02

## (undated) DEVICE — TUBING SMOKE EVAC PNEUMOCLEAR HIGH FLOW 0620050250

## (undated) DEVICE — SOL WATER IRRIG 1000ML BOTTLE 2F7114

## (undated) DEVICE — GOWN LG DISP 9515

## (undated) DEVICE — ESU GROUND PAD ADULT REM W/15' CORD E7507DB

## (undated) DEVICE — SU MONOCRYL+ 4-0 18IN PS2 UND MCP496G

## (undated) DEVICE — TUBING IV EXTENSION SET ANESTHESIA 34" MLL 2C6227

## (undated) DEVICE — ENDO TROCAR FIRST ENTRY KII FIOS Z-THRD 05X100MM CTF03

## (undated) DEVICE — ENDO TROCAR SLEEVE KII Z-THREADED 05X100MM CTS02

## (undated) DEVICE — Device

## (undated) RX ORDER — ONDANSETRON 2 MG/ML
INJECTION INTRAMUSCULAR; INTRAVENOUS
Status: DISPENSED
Start: 2024-06-07

## (undated) RX ORDER — FENTANYL CITRATE 50 UG/ML
INJECTION, SOLUTION INTRAMUSCULAR; INTRAVENOUS
Status: DISPENSED
Start: 2024-06-07

## (undated) RX ORDER — BUPIVACAINE HYDROCHLORIDE 2.5 MG/ML
INJECTION, SOLUTION EPIDURAL; INFILTRATION; INTRACAUDAL
Status: DISPENSED
Start: 2024-06-07

## (undated) RX ORDER — LIDOCAINE HYDROCHLORIDE 10 MG/ML
INJECTION, SOLUTION EPIDURAL; INFILTRATION; INTRACAUDAL; PERINEURAL
Status: DISPENSED
Start: 2024-06-07

## (undated) RX ORDER — PROPOFOL 10 MG/ML
INJECTION, EMULSION INTRAVENOUS
Status: DISPENSED
Start: 2024-06-07

## (undated) RX ORDER — DEXAMETHASONE SODIUM PHOSPHATE 10 MG/ML
INJECTION, SOLUTION INTRAMUSCULAR; INTRAVENOUS
Status: DISPENSED
Start: 2024-06-07